# Patient Record
Sex: FEMALE | Race: WHITE | NOT HISPANIC OR LATINO | Employment: FULL TIME | URBAN - METROPOLITAN AREA
[De-identification: names, ages, dates, MRNs, and addresses within clinical notes are randomized per-mention and may not be internally consistent; named-entity substitution may affect disease eponyms.]

---

## 2017-05-16 ENCOUNTER — ALLSCRIPTS OFFICE VISIT (OUTPATIENT)
Dept: OTHER | Facility: OTHER | Age: 22
End: 2017-05-16

## 2018-01-13 VITALS
SYSTOLIC BLOOD PRESSURE: 118 MMHG | BODY MASS INDEX: 21.33 KG/M2 | WEIGHT: 128 LBS | HEIGHT: 65 IN | DIASTOLIC BLOOD PRESSURE: 70 MMHG | HEART RATE: 76 BPM

## 2018-01-14 NOTE — RESULT NOTES
Verified Results  (1) THIN PREP PAP WITH IMAGING [id number] 12:00AM Shai Vang [address]     Test Name Result Flag Reference   LAB AP CASE REPORT (Report)     Gynecologic Cytology Report            Case: [id number]                  Authorizing Provider: Erica Gunetr MD    Collected:      08/12/2016           First Screen:     QUIRINO Singh    Received:      08/17/2016 1019        Specimen:  LIQUID-BASED PAP, SCREENING, Endocervical   LAB AP GYN PRIMARY INTERPRETATION      Negative for intraepithelial lesion or malignancy  Electronically signed by QUIRINO Singh on 8/18/2016 at 3:45 PM   LAB AP GYN SPECIMEN ADEQUACY      Satisfactory for evaluation  Endocervical/transformation zone component present  LAB AP GYN ADDITIONAL INFORMATION (Report)     [facility]'s FDA approved ,  and ThinPrep Imaging System are   utilized with strict adherence to the 's instruction manual to   prepare gynecologic and non-gynecologic cytology specimens for the   production of ThinPrep slides as well as for gynecologic ThinPrep imaging  These processes have been validated by our laboratory and/or by the     The Pap test is not a diagnostic procedure and should not be used as the   sole means to detect cervical cancer  It is only a screening procedure to   aid in the detection of cervical cancer and its precursors  Both   false-negative and false-positive results have been experienced  Your   patient's test result should be interpreted in this context together with   the history and clinical findings

## 2019-05-22 ENCOUNTER — OFFICE VISIT (OUTPATIENT)
Dept: URGENT CARE | Age: 24
End: 2019-05-22
Payer: COMMERCIAL

## 2019-05-22 VITALS
DIASTOLIC BLOOD PRESSURE: 67 MMHG | OXYGEN SATURATION: 98 % | HEART RATE: 78 BPM | TEMPERATURE: 98 F | BODY MASS INDEX: 24.16 KG/M2 | WEIGHT: 145 LBS | HEIGHT: 65 IN | SYSTOLIC BLOOD PRESSURE: 131 MMHG | RESPIRATION RATE: 18 BRPM

## 2019-05-22 DIAGNOSIS — J01.90 ACUTE SINUSITIS, RECURRENCE NOT SPECIFIED, UNSPECIFIED LOCATION: Primary | ICD-10-CM

## 2019-05-22 PROCEDURE — G0382 LEV 3 HOSP TYPE B ED VISIT: HCPCS | Performed by: FAMILY MEDICINE

## 2019-05-22 RX ORDER — FLUTICASONE PROPIONATE 50 MCG
2 SPRAY, SUSPENSION (ML) NASAL DAILY
COMMUNITY

## 2019-05-22 RX ORDER — MONTELUKAST SODIUM 10 MG/1
1 TABLET ORAL DAILY
COMMUNITY
Start: 2017-06-22 | End: 2020-12-23 | Stop reason: SDUPTHER

## 2019-05-22 RX ORDER — FEXOFENADINE HCL 180 MG/1
TABLET ORAL
COMMUNITY
End: 2020-05-20 | Stop reason: ALTCHOICE

## 2019-05-22 RX ORDER — AMOXICILLIN AND CLAVULANATE POTASSIUM 875; 125 MG/1; MG/1
1 TABLET, FILM COATED ORAL EVERY 12 HOURS SCHEDULED
Qty: 20 TABLET | Refills: 0 | Status: SHIPPED | OUTPATIENT
Start: 2019-05-22 | End: 2019-06-01

## 2019-08-13 ENCOUNTER — TELEPHONE (OUTPATIENT)
Dept: OBGYN CLINIC | Facility: CLINIC | Age: 24
End: 2019-08-13

## 2019-08-13 NOTE — TELEPHONE ENCOUNTER
Patient states Mirena is  and had an appointment to have removed today, but does not have insurance to cover removal  She would like to know if she can just leave in for one month than change on 19 when she has insurance  Advised she needs to use a back up method if Mirena is   Routing to provider for advice

## 2019-09-16 ENCOUNTER — ANNUAL EXAM (OUTPATIENT)
Dept: OBGYN CLINIC | Facility: CLINIC | Age: 24
End: 2019-09-16
Payer: COMMERCIAL

## 2019-09-16 VITALS
WEIGHT: 154 LBS | SYSTOLIC BLOOD PRESSURE: 110 MMHG | HEIGHT: 65 IN | BODY MASS INDEX: 25.66 KG/M2 | DIASTOLIC BLOOD PRESSURE: 62 MMHG

## 2019-09-16 DIAGNOSIS — Z01.419 WELL FEMALE EXAM WITH ROUTINE GYNECOLOGICAL EXAM: Primary | ICD-10-CM

## 2019-09-16 DIAGNOSIS — Z12.4 ENCOUNTER FOR SCREENING FOR MALIGNANT NEOPLASM OF CERVIX: ICD-10-CM

## 2019-09-16 PROBLEM — Z97.5 BREAKTHROUGH BLEEDING WITH IUD: Status: ACTIVE | Noted: 2017-05-16

## 2019-09-16 PROBLEM — N92.1 BREAKTHROUGH BLEEDING WITH IUD: Status: ACTIVE | Noted: 2017-05-16

## 2019-09-16 PROCEDURE — 99395 PREV VISIT EST AGE 18-39: CPT | Performed by: OBSTETRICS & GYNECOLOGY

## 2019-09-16 PROCEDURE — G0145 SCR C/V CYTO,THINLAYER,RESCR: HCPCS | Performed by: OBSTETRICS & GYNECOLOGY

## 2019-09-16 RX ORDER — ESCITALOPRAM OXALATE 5 MG/1
5 TABLET ORAL DAILY
COMMUNITY
End: 2020-03-05 | Stop reason: ALTCHOICE

## 2019-09-16 NOTE — PROGRESS NOTES
ASSESSMENT & PLAN: iMryam Carter is a 25 y o  Mayroni Herrerae with normal gynecologic exam     1   Routine well woman exam done today  2   Pap  was done today  Current ASCCP Guidelines reviewed  Last Pap  2016 :  no abnormalities  3   The patient declined STD testing  Safe sex practices have been discussed  4   Gardasil is recommended for patients from 526 years of age  The patient has not had the Gardasil vaccine series  Patient is not interested in vaccination, information given  5  The patient is sexually active  She has a Mirena (8/2014)  for contraception and options have been discussed  She is unsure if she would like IUD revomval vs replacement  Reviewed Mirena use for 7 yrs in [facility], but FDA approval for 5yrs  Will use back up and return in 1 month for removal, vs replacement with likely Kyleena  6  The following were reviewed in today's visit: breast self exam, STD testing, family planning choices, exercise and healthy diet  7  Patient to return to office in 12 months for annual and 1 month for contraceptive visit  All questions have been answered to her satisfaction  CC:  Annual Gynecologic Examination    HPI: Miryam Carter is a 25 y o  Mayroni Johnson who presents for annual gynecologic examination  She has the following concerns:  IUD    Health Maintenance:    She exercises 3 days per week  She wears her seatbelt routinely  She does perform irregular monthly self breast exams  She feels safe at home  Patients does follow a regular diet  History reviewed  No pertinent past medical history  Past Surgical History:   Procedure Laterality Date    WISDOM TOOTH EXTRACTION         Past OB/Gyn History:  Period Cycle (Days): 30  Period Duration (Days): 5 days   Period Pattern: Regular  Menstrual Flow: Light(spotting)  Menstrual Control: Panty liner, Tampon  Dysmenorrhea: (!) Mild  Dysmenorrhea Symptoms: CrampingPatient's last menstrual period was 09/01/2019 (exact date)    Menstrual History:  OB History        0    Para   0    Term   0       0    AB   0    Living   0       SAB   0    TAB   0    Ectopic   0    Multiple   0    Live Births   0           Obstetric Comments   Menarche 15            History of sexually transmitted infection No  Patient is currently sexually active  heterosexual Birth control: Mirena IUD since       Family History   Problem Relation Age of Onset    No Known Problems Mother     No Known Problems Father     No Known Problems Sister     No Known Problems Brother     No Known Problems Maternal Grandmother     No Known Problems Maternal Grandfather     No Known Problems Paternal Grandmother     No Known Problems Paternal Grandfather     Breast cancer Paternal Aunt        Social History:  Social History     Socioeconomic History    Marital status: /Civil Union     Spouse name: Not on file    Number of children: Not on file    Years of education: Not on file    Highest education level: Not on file   Occupational History    Not on file   Social Needs    Financial resource strain: Not on file    Food insecurity:     Worry: Not on file     Inability: Not on file    Transportation needs:     Medical: Not on file     Non-medical: Not on file   Tobacco Use    Smoking status: Never Smoker    Smokeless tobacco: Never Used   Substance and Sexual Activity    Alcohol use: Yes     Comment: rare    Drug use: Never    Sexual activity: Yes     Partners: Male     Birth control/protection: IUD   Lifestyle    Physical activity:     Days per week: Not on file     Minutes per session: Not on file    Stress: Not on file   Relationships    Social connections:     Talks on phone: Not on file     Gets together: Not on file     Attends Hoahaoism service: Not on file     Active member of club or organization: Not on file     Attends meetings of clubs or organizations: Not on file     Relationship status: Not on file    Intimate partner violence: Fear of current or ex partner: Not on file     Emotionally abused: Not on file     Physically abused: Not on file     Forced sexual activity: Not on file   Other Topics Concern    Not on file   Social History Narrative    Not on file     Presently lives with her   Patient is   Patient is currently employed   Allergies   Allergen Reactions    Seasonal Ic  [Cholestatin]        Current Outpatient Medications:     escitalopram (LEXAPRO) 5 mg tablet, Take 5 mg by mouth daily, Disp: , Rfl:     fexofenadine (ALLEGRA ALLERGY) 180 MG tablet, Take by mouth, Disp: , Rfl:     fluticasone (FLONASE) 50 mcg/act nasal spray, 2 sprays into each nostril daily, Disp: , Rfl:     montelukast (SINGULAIR) 10 mg tablet, Take 1 tablet by mouth daily, Disp: , Rfl:     Review of Systems:  Review of Systems   Constitutional: Negative  HENT: Negative  Respiratory: Negative  Cardiovascular: Negative  Gastrointestinal: Negative  Genitourinary: Negative  Neurological: Negative  Psychiatric/Behavioral: Negative  Physical Exam:  /62   Ht 5' 4 5" (1 638 m)   Wt 69 9 kg (154 lb)   LMP 09/01/2019 (Exact Date)   Breastfeeding? No   BMI 26 03 kg/m²    Physical Exam   Constitutional: She is oriented to person, place, and time  She appears well-developed and well-nourished  No distress  Genitourinary: Vagina normal and uterus normal  There is no rash, tenderness, lesion, injury or Bartholin's cyst on the right labia  There is no rash, tenderness, lesion, injury or Bartholin's cyst on the left labia  Vagina exhibits rugosity  No tenderness or bleeding in the vagina  No vaginal discharge found  Right adnexum does not display mass, does not display tenderness and does not display fullness  Left adnexum does not display mass, does not display tenderness and does not display fullness  Cervix is nulliparous  Cervix exhibits pinkness and visible IUD strings   Cervix does not exhibit motion tenderness, discharge or polyp  Uterus is mobile  Uterus is not enlarged or tender  Genitourinary Comments: Non tender bladder   HENT:   Head: Normocephalic and atraumatic  Cardiovascular: Normal rate, regular rhythm and normal heart sounds  No murmur heard  Pulmonary/Chest: Effort normal and breath sounds normal  No respiratory distress  She has no wheezes  Right breast exhibits no inverted nipple, no mass, no nipple discharge, no skin change and no tenderness  Left breast exhibits no inverted nipple, no mass, no nipple discharge, no skin change and no tenderness  Abdominal: Soft  She exhibits no distension  There is no tenderness  There is no guarding  Neurological: She is alert and oriented to person, place, and time  Skin: Skin is warm and dry  She is not diaphoretic  No erythema  No pallor  Nursing note and vitals reviewed

## 2019-09-20 LAB
LAB AP GYN PRIMARY INTERPRETATION: NORMAL
Lab: NORMAL

## 2020-01-21 ENCOUNTER — OFFICE VISIT (OUTPATIENT)
Dept: URGENT CARE | Age: 25
End: 2020-01-21
Payer: COMMERCIAL

## 2020-01-21 VITALS
DIASTOLIC BLOOD PRESSURE: 60 MMHG | HEART RATE: 102 BPM | TEMPERATURE: 98 F | RESPIRATION RATE: 18 BRPM | SYSTOLIC BLOOD PRESSURE: 126 MMHG | OXYGEN SATURATION: 99 %

## 2020-01-21 DIAGNOSIS — R68.84 JAW PAIN: ICD-10-CM

## 2020-01-21 DIAGNOSIS — H92.01 RIGHT EAR PAIN: Primary | ICD-10-CM

## 2020-01-21 PROCEDURE — 99213 OFFICE O/P EST LOW 20 MIN: CPT | Performed by: FAMILY MEDICINE

## 2020-01-21 RX ORDER — AMOXICILLIN 500 MG/1
500 CAPSULE ORAL EVERY 8 HOURS SCHEDULED
Qty: 30 CAPSULE | Refills: 0 | Status: SHIPPED | OUTPATIENT
Start: 2020-01-21 | End: 2020-01-31

## 2020-01-21 NOTE — PROGRESS NOTES
[facility]        NAME: Patricia Parsons is a 25 y o  female  : 1995    MRN: [id number]  DATE: 2020  TIME: 12:08 PM    Assessment and Plan   Right ear pain [H92 01]  1  Right ear pain  amoxicillin (AMOXIL) 500 mg capsule   2  Jaw pain  amoxicillin (AMOXIL) 500 mg capsule         Patient Instructions     Patient Instructions   Amoxicillin 3 times a day until finished (please take probiotics)  Tylenol, or ibuprofen (Advil/Motrin) as needed  Try Flonase nasal spray 1 spray in each nostril once or twice a day)  Recheck/follow-up with family physician/dentist as discussed  Fernsanto Reissathish   [phone]    Please go to the hospital emergency department if needed  Follow up with PCP/Dentist in 3-5 days  Proceed to  ER if symptoms worsen  Chief Complaint     Chief Complaint   Patient presents with    Earache     right, and down into jaw , x intermittent x 2 weeks         History of Present Illness       Right-sided ear pain that radiates into the right jaw area; slight sinus congestion      Review of Systems   Review of Systems   HENT: Positive for ear pain and sinus pressure  Respiratory: Negative  Cardiovascular: Negative  Musculoskeletal: Negative  Skin: Negative  Neurological: Negative            Current Medications       Current Outpatient Medications:     amoxicillin (AMOXIL) 500 mg capsule, Take 1 capsule (500 mg total) by mouth every 8 (eight) hours for 30 doses, Disp: 30 capsule, Rfl: 0    escitalopram (LEXAPRO) 5 mg tablet, Take 5 mg by mouth daily, Disp: , Rfl:     fexofenadine (ALLEGRA ALLERGY) 180 MG tablet, Take by mouth, Disp: , Rfl:     fluticasone (FLONASE) 50 mcg/act nasal spray, 2 sprays into each nostril daily, Disp: , Rfl:     montelukast (SINGULAIR) 10 mg tablet, Take 1 tablet by mouth daily, Disp: , Rfl:     Current Allergies     Allergies as of 2020 - Reviewed 2020   Allergen Reaction Noted    Seasonal ic  [cholestatin] 11/23/2015            The following portions of the patient's history were reviewed and updated as appropriate: allergies, current medications, past family history, past medical history, past social history, past surgical history and problem list      History reviewed  No pertinent past medical history  Past Surgical History:   Procedure Laterality Date    WISDOM TOOTH EXTRACTION         Family History   Problem Relation Age of Onset    No Known Problems Mother     No Known Problems Father     No Known Problems Sister     No Known Problems Brother     No Known Problems Maternal Grandmother     No Known Problems Maternal Grandfather     No Known Problems Paternal Grandmother     No Known Problems Paternal Grandfather     Breast cancer Paternal Aunt          Medications have been verified  Objective   /60   Pulse 102   Temp 98 °F (36 7 °C)   Resp 18   LMP 01/21/2020   SpO2 99%        Physical Exam     Physical Exam   Constitutional: She is oriented to person, place, and time  She appears well-developed and well-nourished  HENT:   Retraction of the right eardrum; slight nasal congestion; discomfort over the sinuses   Neck: Normal range of motion  Neck supple  Cardiovascular: Normal rate, regular rhythm and normal heart sounds  Pulmonary/Chest: Effort normal and breath sounds normal    Neurological: She is alert and oriented to person, place, and time  No nuchal rigidity   Skin:   Good color and turgor   Psychiatric: She has a normal mood and affect  Her behavior is normal    Nursing note and vitals reviewed

## 2020-01-21 NOTE — PATIENT INSTRUCTIONS
Amoxicillin 3 times a day until finished (please take probiotics)  Tylenol, or ibuprofen (Advil/Motrin) as needed  Try Flonase nasal spray 1 spray in each nostril once or twice a day)  Recheck/follow-up with family physician/dentist as discussed  Corey Rowell   [phone]    Please go to the hospital emergency department if needed

## 2020-02-10 ENCOUNTER — OFFICE VISIT (OUTPATIENT)
Dept: GASTROENTEROLOGY | Facility: CLINIC | Age: 25
End: 2020-02-10
Payer: COMMERCIAL

## 2020-02-10 ENCOUNTER — PREP FOR PROCEDURE (OUTPATIENT)
Dept: GASTROENTEROLOGY | Facility: CLINIC | Age: 25
End: 2020-02-10

## 2020-02-10 VITALS
SYSTOLIC BLOOD PRESSURE: 127 MMHG | TEMPERATURE: 98 F | BODY MASS INDEX: 25.83 KG/M2 | DIASTOLIC BLOOD PRESSURE: 74 MMHG | WEIGHT: 155 LBS | HEIGHT: 65 IN | HEART RATE: 82 BPM

## 2020-02-10 DIAGNOSIS — D80.2 IGA DEFICIENCY, SELECTIVE (HCC): ICD-10-CM

## 2020-02-10 DIAGNOSIS — K21.9 GASTROESOPHAGEAL REFLUX DISEASE, ESOPHAGITIS PRESENCE NOT SPECIFIED: Primary | ICD-10-CM

## 2020-02-10 DIAGNOSIS — R14.0 BLOATING: ICD-10-CM

## 2020-02-10 DIAGNOSIS — K20.90 ESOPHAGITIS: Primary | ICD-10-CM

## 2020-02-10 PROCEDURE — 99204 OFFICE O/P NEW MOD 45 MIN: CPT | Performed by: INTERNAL MEDICINE

## 2020-02-10 NOTE — PATIENT INSTRUCTIONS
Continue to avoid greasy, spicy foods, tomato sauces, citrus juices, peppermint, chocolate, carbonated beverages and limit coffee  Eat small meals and avoid eating 2-3 hours prior to going to bed  Add Tums as needed  EGD scheduled 3/5/20 with Dr Cinthia Mckeon at [facility]  Instructions given to pt during checkout

## 2020-02-10 NOTE — H&P (VIEW-ONLY)
[address] [facility] - Outpatient Consultation  Pranay Bush 25 y o  female MRN: [id number]  Encounter: [id number]          ASSESSMENT AND PLAN:  Ms Gloria Priest was seen today for GERD with esophagitis  Diagnoses and all orders for this visit:    1  Gastroesophageal reflux disease with esophagitis: She reports recent worsening of epigastric burning pain, reflux and regurgitation  Her symptoms have worsened to the point that she has been taking omeprazole 20 mg QD for 3 months  She will continue on this medication for now and add Tums as needed  Given her young age and history of esophagitis, I will perform an EGD to assess for esophagitis, Bustillo's esophagus or hiatal hernia  We discussed treatment options including stretta and Linx  We also discussed dietary and lifestyle measures for management of GERD, and if EGD does not show significant inflammation, we will focus on these  We briefly discussed none medical management of gastroesophageal reflux disease  For this discussion after EGD is completed  2  Bloating: She reports frequent epigastric pressure and discomfort  I recommended she avoid coffee and carbonated beverages  I will assess for causes of her bloating during EGD, as above  She had duodenal biopsy in the past which was negative for celiac disease  3  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[facility] This is managed by her primary team    ______________________________________________________________________    HPI: Pranay Bush is a 25 y o  female with selective IgA deficiency, palpitations and anxiety in the office today for evaluation and management of epigastric burning pain and bloating  EGD in 2014 with Dr Lona Burgos indicated esophagitis at the GE junction and gastritis  Duodenal biopsy was negative for celiac disease  She was prescribed omeprazole but was advised to not take this medication long term       Today, she reports that in spite of a careful diet, her epigastric burning pain, pressure, reflux and regurgitation continue  Her symptoms have worsened to the point that she has been taking omeprazole QD for 3 months  She feels like she would have relief if she could burp but is unable to do so  She denies dysphagia, nausea, vomiting, constipation or diarrhea  She has recently discontinued Lexapro, without any noticeable change in her GERD symptoms  Her father has indigestion  REVIEW OF SYSTEMS:    CONSTITUTIONAL: Denies any fever, chills, rigors, and weight loss  HEENT: No earache or tinnitus  Denies hearing loss or visual disturbances  CARDIOVASCULAR: No chest pain or palpitations  RESPIRATORY: Denies any cough, hemoptysis, shortness of breath or dyspnea on exertion  GASTROINTESTINAL: As noted in the History of Present Illness  GENITOURINARY: No problems with urination  Denies any hematuria or dysuria  NEUROLOGIC: No dizziness or vertigo, denies headaches  MUSCULOSKELETAL: Denies any muscle or joint pain  SKIN: Denies skin rashes or itching  ENDOCRINE: Denies excessive thirst  Denies intolerance to heat or cold  PSYCHOSOCIAL: Denies depression or anxiety  Denies any recent memory loss  Historical Information   History reviewed  No pertinent past medical history    Past Surgical History:   Procedure Laterality Date    WISDOM TOOTH EXTRACTION       Social History   Social History     Substance and Sexual Activity   Alcohol Use Yes    Comment: rare     Social History     Substance and Sexual Activity   Drug Use Never     Social History     Tobacco Use   Smoking Status Never Smoker   Smokeless Tobacco Never Used     Family History   Problem Relation Age of Onset    No Known Problems Mother     No Known Problems Father     No Known Problems Sister     No Known Problems Brother     No Known Problems Maternal Grandmother     No Known Problems Maternal Grandfather     No Known Problems Paternal Grandmother     No Known Problems Paternal Grandfather    [employer] Breast cancer Paternal Aunt        Meds/Allergies       Current Outpatient Medications:     fexofenadine (ALLEGRA ALLERGY) 180 MG tablet    fluticasone (FLONASE) 50 mcg/act nasal spray    levonorgestrel (MIRENA) 20 MCG/24HR IUD    escitalopram (LEXAPRO) 5 mg tablet    montelukast (SINGULAIR) 10 mg tablet    Allergies   Allergen Reactions    Seasonal Ic  [Cholestatin]            Objective     Blood pressure 127/74, pulse 82, temperature 98 °F (36 7 °C), temperature source Tympanic, height 5' 5" (1 651 m), weight 70 3 kg (155 lb), last menstrual period 01/21/2020, not currently breastfeeding  Body mass index is 25 79 kg/m²  PHYSICAL EXAM:      General Appearance:   Alert, cooperative, no distress   HEENT:   Normocephalic, atraumatic, anicteric      Neck:  Supple, symmetrical, trachea midline   Lungs:   Clear to auscultation bilaterally; no rales, rhonchi or wheezing; respirations unlabored    Heart[de-identified]   Regular rate and rhythm; no murmur, rub, or gallop  Abdomen:   Soft, non-tender, non-distended; normal bowel sounds; no masses, no organomegaly    Genitalia:   Deferred    Rectal:   Deferred    Extremities:  No cyanosis, clubbing or edema    Pulses:  2+ and symmetric    Skin:  No jaundice, rashes, or lesions    Lymph nodes:  No palpable cervical lymphadenopathy        Lab Results:   No visits with results within 1 Day(s) from this visit     Latest known visit with results is:   Annual Exam on 09/16/2019   Component Date Value    Case Report 09/16/2019                      Value:Gynecologic Cytology Report                       Case: [id number]                                  Authorizing Provider:  Scarlet Diaz MD     Collected:           09/16/2019 1059              Ordering Location:     [facility]  Received:            09/16/2019 [facility]                                                        First Screen:          Wai Topete QUIRINO Whipple                                                    Specimen:    LIQUID-BASED PAP, SCREENING, Cervix, Endocervical                                          Primary Interpretation 09/16/2019 Negative for intraepithelial lesion or malignancy     Specimen Adequacy 09/16/2019 Satisfactory for evaluation  Endocervical/transformation zone component present   Additional Information 09/16/2019                      Value: This result contains rich text formatting which cannot be displayed here  Radiology Results:   No results found  Attestation:   By signing my name below, Cate Dean, attest that this d ocumentation has been prepared under the direction and in the presence of Kay Lennox, M D  Electronically Signed: Sharon Molina  2/10/2020         I, Kay Lennox, personally performed the services described in this documentation  All medical record entries made by the bridgettibmaggie were at my direction and in my presence  I have reviewed the chart and discharge instructions and agree that the record reflects my personal performance and is accurate and complete  Kay Lennox, M D  2/10/2020

## 2020-02-10 NOTE — PROGRESS NOTES
[facility] - Outpatient Consultation  William Avalos 25 y o  female MRN: [id number]  Encounter: [id number]          ASSESSMENT AND PLAN:  Ms Valentine Jiménez was seen today for GERD with esophagitis  Diagnoses and all orders for this visit:    1  Gastroesophageal reflux disease with esophagitis: She reports recent worsening of epigastric burning pain, reflux and regurgitation  Her symptoms have worsened to the point that she has been taking omeprazole 20 mg QD for 3 months  She will continue on this medication for now and add Tums as needed  Given her young age and history of esophagitis, I will perform an EGD to assess for esophagitis, Bustillo's esophagus or hiatal hernia  We discussed treatment options including stretta and Linx  We also discussed dietary and lifestyle measures for management of GERD, and if EGD does not show significant inflammation, we will focus on these  We briefly discussed none medical management of gastroesophageal reflux disease  For this discussion after EGD is completed  2  Bloating: She reports frequent epigastric pressure and discomfort  I recommended she avoid coffee and carbonated beverages  I will assess for causes of her bloating during EGD, as above  She had duodenal biopsy in the past which was negative for celiac disease  3  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[facility] ): This is managed by her primary team    ______________________________________________________________________    HPI: William Avalos is a 25 y o  female with selective IgA deficiency, palpitations and anxiety in the office today for evaluation and management of epigastric burning pain and bloating  EGD in 2014 with Dr Lady Nazario indicated esophagitis at the GE junction and gastritis  Duodenal biopsy was negative for celiac disease  She was prescribed omeprazole but was advised to not take this medication long term       Today, she reports that in spite of a careful diet, her epigastric burning pain, pressure, reflux and regurgitation continue  Her symptoms have worsened to the point that she has been taking omeprazole QD for 3 months  She feels like she would have relief if she could burp but is unable to do so  She denies dysphagia, nausea, vomiting, constipation or diarrhea  She has recently discontinued Lexapro, without any noticeable change in her GERD symptoms  Her father has indigestion  REVIEW OF SYSTEMS:    CONSTITUTIONAL: Denies any fever, chills, rigors, and weight loss  HEENT: No earache or tinnitus  Denies hearing loss or visual disturbances  CARDIOVASCULAR: No chest pain or palpitations  RESPIRATORY: Denies any cough, hemoptysis, shortness of breath or dyspnea on exertion  GASTROINTESTINAL: As noted in the History of Present Illness  GENITOURINARY: No problems with urination  Denies any hematuria or dysuria  NEUROLOGIC: No dizziness or vertigo, denies headaches  MUSCULOSKELETAL: Denies any muscle or joint pain  SKIN: Denies skin rashes or itching  ENDOCRINE: Denies excessive thirst  Denies intolerance to heat or cold  PSYCHOSOCIAL: Denies depression or anxiety  Denies any recent memory loss  Historical Information   History reviewed  No pertinent past medical history    Past Surgical History:   Procedure Laterality Date    WISDOM TOOTH EXTRACTION       Social History   Social History     Substance and Sexual Activity   Alcohol Use Yes    Comment: rare     Social History     Substance and Sexual Activity   Drug Use Never     Social History     Tobacco Use   Smoking Status Never Smoker   Smokeless Tobacco Never Used     Family History   Problem Relation Age of Onset    No Known Problems Mother     No Known Problems Father     No Known Problems Sister     No Known Problems Brother     No Known Problems Maternal Grandmother     No Known Problems Maternal Grandfather     No Known Problems Paternal Grandmother     No Known Problems Paternal Grandfather    Riley Sharma Breast cancer Paternal Aunt        Meds/Allergies       Current Outpatient Medications:     fexofenadine (ALLEGRA ALLERGY) 180 MG tablet    fluticasone (FLONASE) 50 mcg/act nasal spray    levonorgestrel (MIRENA) 20 MCG/24HR IUD    escitalopram (LEXAPRO) 5 mg tablet    montelukast (SINGULAIR) 10 mg tablet    Allergies   Allergen Reactions    Seasonal Ic  [Cholestatin]            Objective     Blood pressure 127/74, pulse 82, temperature 98 °F (36 7 °C), temperature source Tympanic, height 5' 5" (1 651 m), weight 70 3 kg (155 lb), last menstrual period 01/21/2020, not currently breastfeeding  Body mass index is 25 79 kg/m²  PHYSICAL EXAM:      General Appearance:   Alert, cooperative, no distress   HEENT:   Normocephalic, atraumatic, anicteric      Neck:  Supple, symmetrical, trachea midline   Lungs:   Clear to auscultation bilaterally; no rales, rhonchi or wheezing; respirations unlabored    Heart[de-identified]   Regular rate and rhythm; no murmur, rub, or gallop  Abdomen:   Soft, non-tender, non-distended; normal bowel sounds; no masses, no organomegaly    Genitalia:   Deferred    Rectal:   Deferred    Extremities:  No cyanosis, clubbing or edema    Pulses:  2+ and symmetric    Skin:  No jaundice, rashes, or lesions    Lymph nodes:  No palpable cervical lymphadenopathy        Lab Results:   No visits with results within 1 Day(s) from this visit     Latest known visit with results is:   Annual Exam on 09/16/2019   Component Date Value    Case Report 09/16/2019                      Value:Gynecologic Cytology Report                       Case: [id number]                                  Authorizing Provider:  Juan Carlos Banuelos MD     Collected:           09/16/2019 1059              Ordering Location:     [facility]  Received:            09/16/2019 [facility]                                                        First Screen:          Shelbi Davis QUIRINO Whipple                                                    Specimen:    LIQUID-BASED PAP, SCREENING, Cervix, Endocervical                                          Primary Interpretation 09/16/2019 Negative for intraepithelial lesion or malignancy     Specimen Adequacy 09/16/2019 Satisfactory for evaluation  Endocervical/transformation zone component present   Additional Information 09/16/2019                      Value: This result contains rich text formatting which cannot be displayed here  Radiology Results:   No results found  Attestation:   By signing my name below, Perez Alicea, [email or web address] that this d ocumentation has been prepared under the direction and in the presence of MARCIA Light  Electronically Signed: Cristobal Chaidez  2/10/2020         I, Eloisa Hidalgo, personally performed the services described in this documentation  All medical record entries made by the cristobal were at my direction and in my presence  I have reviewed the chart and discharge instructions and agree that the record reflects my personal performance and is accurate and complete  MARCIA Light  2/10/2020

## 2020-02-11 ENCOUNTER — TRANSCRIBE ORDERS (OUTPATIENT)
Dept: GASTROENTEROLOGY | Facility: CLINIC | Age: 25
End: 2020-02-11

## 2020-02-21 ENCOUNTER — TELEPHONE (OUTPATIENT)
Dept: GASTROENTEROLOGY | Facility: CLINIC | Age: 25
End: 2020-02-21

## 2020-02-21 ENCOUNTER — TRANSCRIBE ORDERS (OUTPATIENT)
Dept: GASTROENTEROLOGY | Facility: CLINIC | Age: 25
End: 2020-02-21

## 2020-02-21 NOTE — TELEPHONE ENCOUNTER
Called [facility] to initiate a prior authorization for patient's EGD  Spoke to [facility]  Requested I fax clinicals to [phone]    Pending Auth [id number]  Call ref #[id number]

## 2020-02-21 NOTE — TELEPHONE ENCOUNTER
Received a call back from [place] at [facility]  Patient's EGD has been approved  Hannah Johns #[id number], eff 3/5 - 6/3/20

## 2020-03-04 ENCOUNTER — ANESTHESIA EVENT (OUTPATIENT)
Dept: GASTROENTEROLOGY | Facility: AMBULARY SURGERY CENTER | Age: 25
End: 2020-03-04

## 2020-03-05 ENCOUNTER — ANESTHESIA (OUTPATIENT)
Dept: GASTROENTEROLOGY | Facility: AMBULARY SURGERY CENTER | Age: 25
End: 2020-03-05

## 2020-03-05 ENCOUNTER — HOSPITAL ENCOUNTER (OUTPATIENT)
Dept: GASTROENTEROLOGY | Facility: AMBULARY SURGERY CENTER | Age: 25
Setting detail: OUTPATIENT SURGERY
Discharge: HOME | End: 2020-03-05
Attending: INTERNAL MEDICINE | Admitting: INTERNAL MEDICINE
Payer: COMMERCIAL

## 2020-03-05 VITALS
RESPIRATION RATE: 18 BRPM | TEMPERATURE: 98.4 F | HEART RATE: 92 BPM | BODY MASS INDEX: 25.83 KG/M2 | WEIGHT: 155 LBS | HEIGHT: 65 IN | OXYGEN SATURATION: 99 % | SYSTOLIC BLOOD PRESSURE: 110 MMHG | DIASTOLIC BLOOD PRESSURE: 60 MMHG

## 2020-03-05 DIAGNOSIS — K21.00 GASTROESOPHAGEAL REFLUX DISEASE WITH ESOPHAGITIS: Primary | ICD-10-CM

## 2020-03-05 DIAGNOSIS — K21.9 GASTROESOPHAGEAL REFLUX DISEASE, ESOPHAGITIS PRESENCE NOT SPECIFIED: ICD-10-CM

## 2020-03-05 LAB
EXT PREGNANCY TEST URINE: NEGATIVE
EXT. CONTROL: NORMAL

## 2020-03-05 PROCEDURE — 43239 EGD BIOPSY SINGLE/MULTIPLE: CPT | Performed by: INTERNAL MEDICINE

## 2020-03-05 PROCEDURE — 88305 TISSUE EXAM BY PATHOLOGIST: CPT | Performed by: PATHOLOGY

## 2020-03-05 PROCEDURE — 81025 URINE PREGNANCY TEST: CPT | Performed by: ANESTHESIOLOGY

## 2020-03-05 RX ORDER — OMEPRAZOLE 20 MG/1
20 CAPSULE, DELAYED RELEASE ORAL DAILY
COMMUNITY
End: 2020-03-05 | Stop reason: SDUPTHER

## 2020-03-05 RX ORDER — LIDOCAINE HYDROCHLORIDE 20 MG/ML
INJECTION, SOLUTION EPIDURAL; INFILTRATION; INTRACAUDAL; PERINEURAL AS NEEDED
Status: DISCONTINUED | OUTPATIENT
Start: 2020-03-05 | End: 2020-03-05 | Stop reason: SURG

## 2020-03-05 RX ORDER — OMEPRAZOLE 40 MG/1
40 CAPSULE, DELAYED RELEASE ORAL DAILY
Qty: 30 CAPSULE | Refills: 1 | Status: SHIPPED | OUTPATIENT
Start: 2020-03-05 | End: 2020-05-07 | Stop reason: SDUPTHER

## 2020-03-05 RX ORDER — CALCIUM CARBONATE 200(500)MG
1 TABLET,CHEWABLE ORAL AS NEEDED
COMMUNITY

## 2020-03-05 RX ORDER — SODIUM CHLORIDE, SODIUM LACTATE, POTASSIUM CHLORIDE, CALCIUM CHLORIDE 600; 310; 30; 20 MG/100ML; MG/100ML; MG/100ML; MG/100ML
125 INJECTION, SOLUTION INTRAVENOUS CONTINUOUS
Status: DISCONTINUED | OUTPATIENT
Start: 2020-03-05 | End: 2020-03-09 | Stop reason: HOSPADM

## 2020-03-05 RX ORDER — PROPOFOL 10 MG/ML
INJECTION, EMULSION INTRAVENOUS AS NEEDED
Status: DISCONTINUED | OUTPATIENT
Start: 2020-03-05 | End: 2020-03-05 | Stop reason: SURG

## 2020-03-05 RX ADMIN — PROPOFOL 20 MG: 10 INJECTION, EMULSION INTRAVENOUS at 11:21

## 2020-03-05 RX ADMIN — LIDOCAINE HYDROCHLORIDE 100 MG: 20 INJECTION, SOLUTION EPIDURAL; INFILTRATION; INTRACAUDAL; PERINEURAL at 11:16

## 2020-03-05 RX ADMIN — PROPOFOL 30 MG: 10 INJECTION, EMULSION INTRAVENOUS at 11:19

## 2020-03-05 RX ADMIN — PROPOFOL 30 MG: 10 INJECTION, EMULSION INTRAVENOUS at 11:17

## 2020-03-05 RX ADMIN — PROPOFOL 40 MG: 10 INJECTION, EMULSION INTRAVENOUS at 11:18

## 2020-03-05 RX ADMIN — PROPOFOL 40 MG: 10 INJECTION, EMULSION INTRAVENOUS at 11:20

## 2020-03-05 RX ADMIN — PROPOFOL 250 MG: 10 INJECTION, EMULSION INTRAVENOUS at 11:16

## 2020-03-05 RX ADMIN — SODIUM CHLORIDE, SODIUM LACTATE, POTASSIUM CHLORIDE, AND CALCIUM CHLORIDE 125 ML/HR: .6; .31; .03; .02 INJECTION, SOLUTION INTRAVENOUS at 10:58

## 2020-03-05 NOTE — ANESTHESIA POSTPROCEDURE EVALUATION
Post-Op Assessment Note    CV Status:  Stable  Pain Score: 0    Pain management: adequate     Mental Status:  Alert and awake   Hydration Status:  Euvolemic   PONV Controlled:  Controlled   Airway Patency:  Patent and adequate   Post Op Vitals Reviewed: Yes      Staff: CRNA           BP   125/71   Temp      Pulse  91   Resp   13   SpO2   96%

## 2020-03-05 NOTE — ANESTHESIA PREPROCEDURE EVALUATION
Review of Systems/Medical History  Patient summary reviewed  Chart reviewed  No history of anesthetic complications     Cardiovascular  Exercise tolerance (METS): >4,     Pulmonary  Negative pulmonary ROS        GI/Hepatic  Negative GI/hepatic ROS          Negative  ROS        Endo/Other  Negative endo/other ROS      GYN  Not currently pregnant ,          Hematology   Musculoskeletal       Neurology   Psychology   Anxiety,              Physical Exam    Airway    Mallampati score: I  TM Distance: >3 FB  Neck ROM: full     Dental       Cardiovascular      Pulmonary      Other Findings        Anesthesia Plan  ASA Score- 1     Anesthesia Type- IV sedation with anesthesia with ASA Monitors  Additional Monitors:   Airway Plan:         Plan Factors-    Induction- intravenous  Postoperative Plan-     Informed Consent- Anesthetic plan and risks discussed with patient  I personally reviewed this patient with the CRNA  Discussed and agreed on the Anesthesia Plan with the CRNA  Gurvinder Stinson

## 2020-03-05 NOTE — DISCHARGE INSTRUCTIONS
Gastroesophageal Reflux Disease   WHAT YOU NEED TO KNOW:   Gastroesophageal reflux occurs when acid and food in the stomach back up into the esophagus  Gastroesophageal reflux disease (GERD) is reflux that occurs more than twice a week for a few weeks  It usually causes heartburn and other symptoms  GERD can cause other health problems over time if it is not treated  DISCHARGE INSTRUCTIONS:   Seek care immediately if:   · You feel full and cannot burp or vomit  · You have severe chest pain and sudden trouble breathing  · Your bowel movements are black, bloody, or tarry-looking  · Your vomit looks like coffee grounds or has blood in it  Contact your healthcare provider if:   · You vomit large amounts, or you vomit often  · You have trouble breathing after you vomit  · You have trouble swallowing, or pain with swallowing  · You are losing weight without trying  · Your symptoms get worse or do not improve with treatment  · You have questions or concerns about your condition or care  Medicines:   · Medicines  are used to decrease stomach acid  Medicine may also be used to help your lower esophageal sphincter and stomach contract (tighten) more  · Take your medicine as directed  Contact your healthcare provider if you think your medicine is not helping or if you have side effects  Tell him or her if you are allergic to any medicine  Keep a list of the medicines, vitamins, and herbs you take  Include the amounts, and when and why you take them  Bring the list or the pill bottles to follow-up visits  Carry your medicine list with you in case of an emergency  Manage GERD:   · Do not have foods or drinks that may increase heartburn  These include chocolate, peppermint, fried or fatty foods, drinks that contain caffeine, or carbonated drinks (soda)  Other foods include spicy foods, onions, tomatoes, and tomato-based foods   Do not have foods or drinks that can irritate your esophagus, such as citrus fruits, juices, and alcohol  · Do not eat large meals  When you eat a lot of food at one time, your stomach needs more acid to digest it  Eat 6 small meals each day instead of 3 large ones, and eat slowly  Do not eat meals 2 to 3 hours before bedtime  · Elevate the head of your bed  Place 6-inch blocks under the head of your bed frame  You may also use more than one pillow under your head and shoulders while you sleep  · Maintain a healthy weight  If you are overweight, weight loss may help relieve symptoms of GERD  · Do not smoke  Smoking weakens the lower esophageal sphincter and increases the risk of GERD  Ask your healthcare provider for information if you currently smoke and need help to quit  E-cigarettes or smokeless tobacco still contain nicotine  Talk to your healthcare provider before you use these products  · Do not wear clothing that is tight around your waist   Tight clothing can put pressure on your stomach and cause or worsen GERD symptoms  Follow up with your healthcare provider as directed:  Write down your questions so you remember to ask them during your visits  © 2017 [facility] Information is for End User's use only and may not be sold, redistributed or otherwise used for commercial purposes  All illustrations and images included in CareNotes® are the copyrighted property of A D A M , Inc  or Ryan Aguiar  The above information is an  only  It is not intended as medical advice for individual conditions or treatments  Talk to your doctor, nurse or pharmacist before following any medical regimen to see if it is safe and effective for you

## 2020-03-05 NOTE — INTERVAL H&P NOTE
H&P reviewed  After examining the patient I find no changes in the patients condition since the H&P had been written      Vitals:    03/05/20 1053   BP: 120/59   Pulse: 100   Resp: 18   Temp: 98 8 °F (37 1 °C)   SpO2: 100%

## 2020-05-07 ENCOUNTER — TELEPHONE (OUTPATIENT)
Dept: GASTROENTEROLOGY | Facility: CLINIC | Age: 25
End: 2020-05-07

## 2020-05-07 DIAGNOSIS — K21.00 GASTROESOPHAGEAL REFLUX DISEASE WITH ESOPHAGITIS: ICD-10-CM

## 2020-05-07 RX ORDER — OMEPRAZOLE 40 MG/1
40 CAPSULE, DELAYED RELEASE ORAL DAILY
Qty: 30 CAPSULE | Refills: 5 | Status: SHIPPED | OUTPATIENT
Start: 2020-05-07 | End: 2020-05-20 | Stop reason: ALTCHOICE

## 2020-05-13 ENCOUNTER — TELEMEDICINE (OUTPATIENT)
Dept: GASTROENTEROLOGY | Facility: CLINIC | Age: 25
End: 2020-05-13
Payer: COMMERCIAL

## 2020-05-13 VITALS — WEIGHT: 155 LBS | HEIGHT: 65 IN | BODY MASS INDEX: 25.83 KG/M2

## 2020-05-13 DIAGNOSIS — K21.00 GASTROESOPHAGEAL REFLUX DISEASE WITH ESOPHAGITIS: Primary | ICD-10-CM

## 2020-05-13 DIAGNOSIS — R14.0 BLOATING: ICD-10-CM

## 2020-05-13 PROCEDURE — 1036F TOBACCO NON-USER: CPT | Performed by: INTERNAL MEDICINE

## 2020-05-13 PROCEDURE — 99213 OFFICE O/P EST LOW 20 MIN: CPT | Performed by: INTERNAL MEDICINE

## 2020-05-13 PROCEDURE — 3008F BODY MASS INDEX DOCD: CPT | Performed by: INTERNAL MEDICINE

## 2020-05-13 RX ORDER — FAMOTIDINE 20 MG/1
20 TABLET, FILM COATED ORAL 2 TIMES DAILY
Qty: 30 TABLET | Refills: 3 | Status: SHIPPED | OUTPATIENT
Start: 2020-05-13 | End: 2020-05-20 | Stop reason: SDUPTHER

## 2020-05-20 ENCOUNTER — OFFICE VISIT (OUTPATIENT)
Dept: FAMILY MEDICINE CLINIC | Facility: CLINIC | Age: 25
End: 2020-05-20
Payer: COMMERCIAL

## 2020-05-20 VITALS
SYSTOLIC BLOOD PRESSURE: 122 MMHG | HEART RATE: 74 BPM | BODY MASS INDEX: 25.16 KG/M2 | WEIGHT: 151 LBS | HEIGHT: 65 IN | TEMPERATURE: 98 F | DIASTOLIC BLOOD PRESSURE: 78 MMHG

## 2020-05-20 DIAGNOSIS — J30.9 ALLERGIC RHINITIS, UNSPECIFIED SEASONALITY, UNSPECIFIED TRIGGER: ICD-10-CM

## 2020-05-20 DIAGNOSIS — K21.00 GASTROESOPHAGEAL REFLUX DISEASE WITH ESOPHAGITIS: ICD-10-CM

## 2020-05-20 DIAGNOSIS — F41.9 ANXIETY: ICD-10-CM

## 2020-05-20 DIAGNOSIS — Z00.00 ANNUAL PHYSICAL EXAM: Primary | ICD-10-CM

## 2020-05-20 PROCEDURE — 99203 OFFICE O/P NEW LOW 30 MIN: CPT | Performed by: FAMILY MEDICINE

## 2020-05-20 PROCEDURE — 1036F TOBACCO NON-USER: CPT | Performed by: FAMILY MEDICINE

## 2020-05-20 PROCEDURE — 3008F BODY MASS INDEX DOCD: CPT | Performed by: FAMILY MEDICINE

## 2020-05-20 PROCEDURE — 99385 PREV VISIT NEW AGE 18-39: CPT | Performed by: FAMILY MEDICINE

## 2020-05-20 PROCEDURE — 3008F BODY MASS INDEX DOCD: CPT | Performed by: OBSTETRICS & GYNECOLOGY

## 2020-05-20 RX ORDER — MONTELUKAST SODIUM 10 MG/1
10 TABLET ORAL
Qty: 30 TABLET | Refills: 5 | Status: SHIPPED | OUTPATIENT
Start: 2020-05-20 | End: 2020-08-20

## 2020-05-20 RX ORDER — FAMOTIDINE 40 MG/1
40 TABLET, FILM COATED ORAL DAILY
Qty: 30 TABLET | Refills: 3 | Status: SHIPPED | OUTPATIENT
Start: 2020-05-20 | End: 2020-09-25

## 2020-05-20 RX ORDER — ALPRAZOLAM 0.25 MG/1
0.25 TABLET ORAL 3 TIMES DAILY PRN
Qty: 40 TABLET | Refills: 1 | Status: SHIPPED | OUTPATIENT
Start: 2020-05-20 | End: 2021-08-10 | Stop reason: ALTCHOICE

## 2020-05-22 ENCOUNTER — TELEMEDICINE (OUTPATIENT)
Dept: GASTROENTEROLOGY | Facility: CLINIC | Age: 25
End: 2020-05-22
Payer: COMMERCIAL

## 2020-05-22 DIAGNOSIS — K21.00 GASTROESOPHAGEAL REFLUX DISEASE WITH ESOPHAGITIS: ICD-10-CM

## 2020-05-22 DIAGNOSIS — R10.13 DYSPEPSIA: Primary | ICD-10-CM

## 2020-05-22 PROCEDURE — 99214 OFFICE O/P EST MOD 30 MIN: CPT | Performed by: PHYSICIAN ASSISTANT

## 2020-05-24 ENCOUNTER — HOSPITAL ENCOUNTER (OUTPATIENT)
Dept: RADIOLOGY | Facility: HOSPITAL | Age: 25
Discharge: HOME | End: 2020-05-24
Payer: COMMERCIAL

## 2020-05-24 DIAGNOSIS — R10.13 DYSPEPSIA: ICD-10-CM

## 2020-05-24 DIAGNOSIS — K21.00 GASTROESOPHAGEAL REFLUX DISEASE WITH ESOPHAGITIS: ICD-10-CM

## 2020-05-24 PROCEDURE — 76705 ECHO EXAM OF ABDOMEN: CPT

## 2020-07-12 ENCOUNTER — OFFICE VISIT (OUTPATIENT)
Dept: URGENT CARE | Facility: MEDICAL CENTER | Age: 25
End: 2020-07-12
Payer: COMMERCIAL

## 2020-07-12 VITALS
HEIGHT: 65 IN | OXYGEN SATURATION: 100 % | BODY MASS INDEX: 24.66 KG/M2 | DIASTOLIC BLOOD PRESSURE: 60 MMHG | TEMPERATURE: 97.5 F | WEIGHT: 148 LBS | SYSTOLIC BLOOD PRESSURE: 136 MMHG | HEART RATE: 106 BPM | RESPIRATION RATE: 18 BRPM

## 2020-07-12 DIAGNOSIS — H60.313 DIFFUSE OTITIS EXTERNA OF BOTH EARS, UNSPECIFIED CHRONICITY: Primary | ICD-10-CM

## 2020-07-12 PROCEDURE — 99213 OFFICE O/P EST LOW 20 MIN: CPT | Performed by: PHYSICIAN ASSISTANT

## 2020-07-12 NOTE — PROGRESS NOTES
[facility]        NAME: Lauren Hagan is a 22 y o  female  : 1995    MRN: [id number]  DATE: 2020  TIME: 2:44 PM    Assessment and Plan   Diffuse otitis externa of both ears, unspecified chronicity [H60 313]  1  Diffuse otitis externa of both ears, unspecified chronicity  neomycin-polymyxin-hydrocortisone (CORTISPORIN) otic solution         Patient Instructions     1  Use Corticporin 3 drops 3x daily into both ears  2  Motrin as needed for pain  3  Follow up with PCP in 3-5 days if symptoms worsen or persist        Chief Complaint     Chief Complaint   Patient presents with    Earache     Pt  with b/l ear pain for the past week  History of Present Illness       Palomo Rucker is a 22-year-old female presents with a 1 week history of bilateral ear pain  Patient reports no fever, change in hearing or ear drainage  She also states she had middle ear effusions in both ears 1 month prior and was started on Singulair by her PCP  Review of Systems   Review of Systems   Constitutional: Negative  HENT: Positive for ear pain  Negative for ear discharge  Respiratory: Negative  Gastrointestinal: Negative            Current Medications       Current Outpatient Medications:     ALPRAZolam (XANAX) 0 25 mg tablet, Take 1 tablet (0 25 mg total) by mouth 3 (three) times a day as needed for anxiety, Disp: 40 tablet, Rfl: 1    calcium carbonate (TUMS) 500 mg chewable tablet, Chew 1 tablet as needed for indigestion or heartburn, Disp: , Rfl:     fluticasone (FLONASE) 50 mcg/act nasal spray, 2 sprays into each nostril daily, Disp: , Rfl:     levonorgestrel (MIRENA) 20 MCG/24HR IUD, 1 each by Intrauterine route once, Disp: , Rfl:     Loratadine (CLARITIN PO), Take by mouth, Disp: , Rfl:     montelukast (SINGULAIR) 10 mg tablet, Take 1 tablet (10 mg total) by mouth daily at bedtime, Disp: 30 tablet, Rfl: 5    OMEPRAZOLE PO, Take by mouth, Disp: , Rfl:     famotidine (PEPCID) 40 MG tablet, Take 1 tablet (40 mg total) by mouth daily, Disp: 30 tablet, Rfl: 3    montelukast (SINGULAIR) 10 mg tablet, Take 1 tablet by mouth daily, Disp: , Rfl:     neomycin-polymyxin-hydrocortisone (CORTISPORIN) otic solution, Administer 4 drops into both ears every 8 (eight) hours for 7 days, Disp: 10 mL, Rfl: 0    Current Allergies     Allergies as of 07/12/2020    (No Known Allergies)            The following portions of the patient's history were reviewed and updated as appropriate: allergies, current medications, past family history, past medical history, past social history, past surgical history and problem list      Past Medical History:   Diagnosis Date    GERD (gastroesophageal reflux disease)     Seasonal allergies        Past Surgical History:   Procedure Laterality Date    UPPER GASTROINTESTINAL ENDOSCOPY      WISDOM TOOTH EXTRACTION         Family History   Problem Relation Age of Onset    No Known Problems Mother     No Known Problems Father     No Known Problems Sister     No Known Problems Brother     No Known Problems Maternal Grandmother     No Known Problems Maternal Grandfather     No Known Problems Paternal Grandmother     No Known Problems Paternal Grandfather     Breast cancer Paternal Aunt          Medications have been verified  Objective   /60   Pulse (!) 106   Temp 97 5 °F (36 4 °C) (Temporal)   Resp 18   Ht 5' 5" (1 651 m)   Wt 67 1 kg (148 lb)   SpO2 100%   BMI 24 63 kg/m²        Physical Exam     Physical Exam   Constitutional: She appears well-developed and well-nourished  No distress  HENT:   Head: Normocephalic and atraumatic  Right Ear: Tympanic membrane normal    Left Ear: Tympanic membrane normal    Ears:    Nose: Nose normal    Mouth/Throat: Uvula is midline, oropharynx is clear and moist and mucous membranes are normal    Cardiovascular: Normal rate, regular rhythm and normal heart sounds  No murmur heard    Pulmonary/Chest: Effort normal and breath sounds normal

## 2020-07-12 NOTE — PATIENT INSTRUCTIONS
1  Use Corticporin 3 drops 3x daily into both ears  2  Motrin as needed for pain  3   Follow up with PCP in 3-5 days if symptoms worsen or persist

## 2020-08-04 ENCOUNTER — TELEMEDICINE (OUTPATIENT)
Dept: FAMILY MEDICINE CLINIC | Facility: CLINIC | Age: 25
End: 2020-08-04
Payer: COMMERCIAL

## 2020-08-04 DIAGNOSIS — F41.9 ANXIETY: Primary | ICD-10-CM

## 2020-08-04 PROCEDURE — 99214 OFFICE O/P EST MOD 30 MIN: CPT | Performed by: NURSE PRACTITIONER

## 2020-08-04 PROCEDURE — 1036F TOBACCO NON-USER: CPT | Performed by: NURSE PRACTITIONER

## 2020-08-04 RX ORDER — ESCITALOPRAM OXALATE 10 MG/1
10 TABLET ORAL DAILY
Qty: 90 TABLET | Refills: 1 | Status: SHIPPED | OUTPATIENT
Start: 2020-08-04 | End: 2021-02-18

## 2020-08-05 NOTE — PROGRESS NOTES
Assessment/Plan:     Diagnoses and all orders for this visit:    Anxiety  -     escitalopram (LEXAPRO) 10 mg tablet; Take 1 tablet (10 mg total) by mouth daily        Discussed with patient plan to restart her on escitalopram 5 mg daily with an increase in one week to 10 mg daily  Discussed with patient that medication needs to build-up until full effect of medication felt - approximately one month  Patient instructed to follow up with primary care provider in one month to discuss efficiency of the medication or sooner if needed  Patient instructed to call for problems or concerns in the interim    Subjective:      Patient ID: Ruby Gonzales is a 22 y o  female  22 y  o female is following up on a conversation that she had with her primary care provider on 05/20/2020  She reports that she was on escitalopram (Lexapro) in the past for anxiety but was weaned off because she thought the anxiety had improved  She is having an increase in her anxiety level again and wanted to discuss possible medication choices she as to manage  she states that her  and her are thinking about pregnancy in the future so interested in a medication that can weaned off easily  She did feel the Lexapro was helpful and does not think there is a depression component involved             Family History   Problem Relation Age of Onset    No Known Problems Mother     No Known Problems Father     No Known Problems Sister     No Known Problems Brother     No Known Problems Maternal Grandmother     No Known Problems Maternal Grandfather     No Known Problems Paternal Grandmother     No Known Problems Paternal Grandfather     Breast cancer Paternal Aunt      Social History     Socioeconomic History    Marital status: /Civil Union     Spouse name: Not on file    Number of children: Not on file    Years of education: Not on file    Highest education level: Not on file   Occupational History    Not on file   Social Needs  Financial resource strain: Not on file    Food insecurity     Worry: Not on file     Inability: Not on file   aiHit needs     Medical: Not on file     Non-medical: Not on file   Tobacco Use    Smoking status: Never Smoker    Smokeless tobacco: Never Used   Substance and Sexual Activity    Alcohol use: Yes     Frequency: 2-3 times a week     Drinks per session: 1 or 2     Binge frequency: Never    Drug use: Never    Sexual activity: Yes     Partners: Male     Birth control/protection: I U D     Lifestyle    Physical activity     Days per week: Not on file     Minutes per session: Not on file    Stress: Not on file   Relationships    Social connections     Talks on phone: Not on file     Gets together: Not on file     Attends Worship service: Not on file     Active member of club or organization: Not on file     Attends meetings of clubs or organizations: Not on file     Relationship status: Not on file    Intimate partner violence     Fear of current or ex partner: Not on file     Emotionally abused: Not on file     Physically abused: Not on file     Forced sexual activity: Not on file   Other Topics Concern    Not on file   Social History Narrative    Not on file     E-Cigarette/Vaping    E-Cigarette Use Never User      E-Cigarette/Vaping Substances     Past Medical History:   Diagnosis Date    GERD (gastroesophageal reflux disease)     Seasonal allergies      Past Surgical History:   Procedure Laterality Date    UPPER GASTROINTESTINAL ENDOSCOPY      WISDOM TOOTH EXTRACTION       No Known Allergies    Current Outpatient Medications:     ALPRAZolam (XANAX) 0 25 mg tablet, Take 1 tablet (0 25 mg total) by mouth 3 (three) times a day as needed for anxiety, Disp: 40 tablet, Rfl: 1    calcium carbonate (TUMS) 500 mg chewable tablet, Chew 1 tablet as needed for indigestion or heartburn, Disp: , Rfl:     escitalopram (LEXAPRO) 10 mg tablet, Take 1 tablet (10 mg total) by mouth daily, Disp: 90 tablet, Rfl: 1    famotidine (PEPCID) 40 MG tablet, Take 1 tablet (40 mg total) by mouth daily, Disp: 30 tablet, Rfl: 3    fluticasone (FLONASE) 50 mcg/act nasal spray, 2 sprays into each nostril daily, Disp: , Rfl:     levonorgestrel (MIRENA) 20 MCG/24HR IUD, 1 each by Intrauterine route once, Disp: , Rfl:     Loratadine (CLARITIN PO), Take by mouth, Disp: , Rfl:     montelukast (SINGULAIR) 10 mg tablet, Take 1 tablet by mouth daily, Disp: , Rfl:     montelukast (SINGULAIR) 10 mg tablet, Take 1 tablet (10 mg total) by mouth daily at bedtime, Disp: 30 tablet, Rfl: 5    neomycin-polymyxin-hydrocortisone (CORTISPORIN) otic solution, Administer 4 drops into both ears every 8 (eight) hours for 7 days, Disp: 10 mL, Rfl: 0    OMEPRAZOLE PO, Take by mouth, Disp: , Rfl:     Review of Systems   Constitutional: Negative for activity change, appetite change and unexpected weight change  HENT: Negative  Eyes: Negative for visual disturbance  Respiratory: Negative for cough, chest tightness, shortness of breath and wheezing  Cardiovascular: Negative for chest pain, palpitations and leg swelling  Gastrointestinal: Negative for abdominal pain, diarrhea and nausea  Endocrine: Negative for polydipsia and polyuria  Genitourinary: Negative  Musculoskeletal: Negative  Skin: Negative  Allergic/Immunologic: Negative  Neurological: Negative for dizziness, weakness, light-headedness and headaches  Hematological: Negative  Psychiatric/Behavioral: The patient is nervous/anxious  Objective: There were no vitals taken for this visit  Physical Exam   Constitutional: She is oriented to person, place, and time  She appears well-developed  She is cooperative  No distress  HENT:   Head: Normocephalic and atraumatic  Right Ear: External ear normal    Left Ear: External ear normal    Eyes: Pupils are equal, round, and reactive to light   Conjunctivae and lids are normal    Neck: Trachea normal    Cardiovascular: Normal rate and regular rhythm  Pulmonary/Chest: Effort normal  No respiratory distress  She has no wheezes  Abdominal: Normal appearance  Neurological: She is alert and oriented to person, place, and time  Psychiatric: Her speech is normal and behavior is normal  Her mood appears anxious  She is not agitated, not aggressive and not hyperactive  She does not exhibit a depressed mood

## 2020-08-05 NOTE — PROGRESS NOTES
Virtual Regular Visit      Assessment/Plan:    Problem List Items Addressed This Visit        Other    Anxiety - Primary    Relevant Medications    escitalopram (LEXAPRO) 10 mg tablet         Reason for visit is   Chief Complaint   Patient presents with    Virtual Regular Visit        Encounter provider Nicol Rodríguez    Provider [place] at [address] [place], [place] [address] [id number] [address]      Recent Visits  Date Type Provider Dept   08/04/20 Ernestina Fernando 429 recent visits within past 7 days and meeting all other requirements     Future Appointments  No visits were found meeting these conditions  Showing future appointments within next 150 days and meeting all other requirements        The patient was identified by name and date of birth  Fariba Hein was informed that this is a telemedicine visit and that the visit is being conducted through [employer] and patient was informed that this is not a secure, HIPAA-complaint platform  She agrees to proceed     My office door was closed  No one else was in the room  She acknowledged consent and understanding of privacy and security of the video platform  The patient has agreed to participate and understands they can discontinue the visit at any time  Patient is aware this is a billable service  Subjective    Fariba Hein is a 22 y  o female that is following up on a conversation that she had with her primary care provider on 05/20/2020  She reports that she was on escitalopram (Lexapro) in the past for anxiety but was weaned off because she thought the anxiety had improved  She is having an increase in her anxiety level again and wanted to discuss possible medication choices she as to manage  she states that her  and her are thinking about pregnancy in the future so interested in a medication that can weaned off easily   She did feel the Lexapro was helpful and does not think there is a depression component involved  Past Medical History:   Diagnosis Date    GERD (gastroesophageal reflux disease)     Seasonal allergies        Past Surgical History:   Procedure Laterality Date    UPPER GASTROINTESTINAL ENDOSCOPY      WISDOM TOOTH EXTRACTION         Current Outpatient Medications   Medication Sig Dispense Refill    ALPRAZolam (XANAX) 0 25 mg tablet Take 1 tablet (0 25 mg total) by mouth 3 (three) times a day as needed for anxiety 40 tablet 1    calcium carbonate (TUMS) 500 mg chewable tablet Chew 1 tablet as needed for indigestion or heartburn      escitalopram (LEXAPRO) 10 mg tablet Take 1 tablet (10 mg total) by mouth daily 90 tablet 1    famotidine (PEPCID) 40 MG tablet Take 1 tablet (40 mg total) by mouth daily 30 tablet 3    fluticasone (FLONASE) 50 mcg/act nasal spray 2 sprays into each nostril daily      levonorgestrel (MIRENA) 20 MCG/24HR IUD 1 each by Intrauterine route once      Loratadine (CLARITIN PO) Take by mouth      montelukast (SINGULAIR) 10 mg tablet Take 1 tablet by mouth daily      montelukast (SINGULAIR) 10 mg tablet Take 1 tablet (10 mg total) by mouth daily at bedtime 30 tablet 5    neomycin-polymyxin-hydrocortisone (CORTISPORIN) otic solution Administer 4 drops into both ears every 8 (eight) hours for 7 days 10 mL 0    OMEPRAZOLE PO Take by mouth       No current facility-administered medications for this visit  No Known Allergies    Review of Systems   Constitutional: Negative for activity change, appetite change and unexpected weight change  HENT: Negative  Eyes: Negative for visual disturbance  Respiratory: Negative for cough, chest tightness, shortness of breath and wheezing  Cardiovascular: Negative for chest pain, palpitations and leg swelling  Gastrointestinal: Negative for abdominal pain, diarrhea and nausea  Endocrine: Negative for polydipsia and polyuria  Genitourinary: Negative  Musculoskeletal: Negative  Skin: Negative  Allergic/Immunologic: Negative  Neurological: Negative for dizziness, weakness, light-headedness and headaches  Hematological: Negative  Psychiatric/Behavioral: The patient is nervous/anxious  Video Exam    There were no vitals filed for this visit  Physical Exam   Constitutional: She is oriented to person, place, and time  She appears well-developed  She is cooperative  No distress  HENT:   Head: Normocephalic and atraumatic  Right Ear: External ear normal    Left Ear: External ear normal    Eyes: Pupils are equal, round, and reactive to light  Conjunctivae and lids are normal    Neck: Trachea normal    Cardiovascular: Normal rate and regular rhythm  Pulmonary/Chest: Effort normal  No respiratory distress  She has no wheezes  Abdominal: Normal appearance  Neurological: She is alert and oriented to person, place, and time  Psychiatric: Her speech is normal and behavior is normal  Her mood appears anxious  She is not agitated, not aggressive and not hyperactive  She does not exhibit a depressed mood  Diagnoses and all orders for this visit:    Anxiety  -     escitalopram (LEXAPRO) 10 mg tablet; Take 1 tablet (10 mg total) by mouth daily       Discussed with patient plan to restart her on escitalopram 5 mg daily with an increase in one week to 10 mg daily  Discussed with patient that medication needs to build-up until full effect of medication felt - approximately one month  Patient instructed to follow up with primary care provider in one month to discuss efficiency of the medication or sooner if needed  Patient instructed to call for problems or concerns in the interim    I spent 20 minutes directly with the patient during this visit      VIRTUAL VISIT [address] acknowledges that she has consented to an online visit or consultation   She understands that the online visit is based solely on information provided by her, and that, in the absence of a face-to-face physical evaluation by the physician, the diagnosis she receives is both limited and provisional in terms of accuracy and completeness  This is not intended to replace a full medical face-to-face evaluation by the physician  Laurel Singh understands and accepts these terms

## 2020-08-20 ENCOUNTER — OFFICE VISIT (OUTPATIENT)
Dept: OBGYN CLINIC | Facility: CLINIC | Age: 25
End: 2020-08-20
Payer: COMMERCIAL

## 2020-08-20 VITALS
DIASTOLIC BLOOD PRESSURE: 72 MMHG | SYSTOLIC BLOOD PRESSURE: 114 MMHG | WEIGHT: 146 LBS | TEMPERATURE: 98.1 F | BODY MASS INDEX: 24.3 KG/M2

## 2020-08-20 DIAGNOSIS — N60.12 FIBROCYSTIC BREAST CHANGES OF BOTH BREASTS: Primary | ICD-10-CM

## 2020-08-20 DIAGNOSIS — N60.11 FIBROCYSTIC BREAST CHANGES OF BOTH BREASTS: Primary | ICD-10-CM

## 2020-08-20 PROCEDURE — 99213 OFFICE O/P EST LOW 20 MIN: CPT | Performed by: OBSTETRICS & GYNECOLOGY

## 2020-08-20 PROCEDURE — 1036F TOBACCO NON-USER: CPT | Performed by: OBSTETRICS & GYNECOLOGY

## 2020-08-21 NOTE — PROGRESS NOTES
Assessment/Plan:    Fibrocystic breast changes of both breasts    Reviewed with pt that her breast exam appears normal   Recommend continued surveillance   If worsening or still present will do an ultrasoun    Subjective:      Patient ID: Ritika Ghotra is a 22 y o  female  HPI  25y  presents with the complaint of a breast lump that has been present for 2 months  It is not tender  It has not grown in size  She denies breast discharge  She denies a family history of breast disease  The following portions of the patient's history were reviewed and updated as appropriate: allergies, current medications, past family history, past medical history, past social history, past surgical history and problem list     Review of Systems   Constitutional: Negative  HENT: Negative  Respiratory: Negative  Cardiovascular: Negative  Gastrointestinal: Negative  Genitourinary: Negative  Neurological: Negative  Psychiatric/Behavioral: Negative  Objective:      /72   Temp 98 1 °F (36 7 °C)   Wt 66 2 kg (146 lb)   LMP 2020 (Exact Date)   BMI 24 30 kg/m²          Physical Exam  Vitals signs and nursing note reviewed  Constitutional:       Appearance: Normal appearance  HENT:      Head: Normocephalic and atraumatic  Eyes:      Extraocular Movements: Extraocular movements intact  Conjunctiva/sclera: Conjunctivae normal       Pupils: Pupils are equal, round, and reactive to light  Chest:      Breasts:         Right: Normal  No swelling, bleeding, inverted nipple, mass, nipple discharge, skin change or tenderness  Left: Normal  No swelling, bleeding, inverted nipple, mass, nipple discharge, skin change or tenderness  Genitourinary:     Labia:         Right: No rash, tenderness or lesion  Left: No rash, tenderness or lesion  Urethra: No prolapse or urethral pain  Skin:     General: Skin is warm and dry        Coloration: Skin is not jaundiced or pale    Neurological:      General: No focal deficit present  Mental Status: She is alert and oriented to person, place, and time

## 2020-09-25 ENCOUNTER — PROCEDURE VISIT (OUTPATIENT)
Dept: OBGYN CLINIC | Facility: CLINIC | Age: 25
End: 2020-09-25
Payer: COMMERCIAL

## 2020-09-25 VITALS
WEIGHT: 143 LBS | HEIGHT: 65 IN | DIASTOLIC BLOOD PRESSURE: 62 MMHG | SYSTOLIC BLOOD PRESSURE: 102 MMHG | TEMPERATURE: 98.7 F | BODY MASS INDEX: 23.82 KG/M2

## 2020-09-25 DIAGNOSIS — Z30.430 ENCOUNTER FOR IUD INSERTION: Primary | ICD-10-CM

## 2020-09-25 LAB — SL AMB POCT URINE HCG: NEGATIVE

## 2020-09-25 PROCEDURE — 58300 INSERT INTRAUTERINE DEVICE: CPT | Performed by: OBSTETRICS & GYNECOLOGY

## 2020-09-25 PROCEDURE — 58301 REMOVE INTRAUTERINE DEVICE: CPT | Performed by: OBSTETRICS & GYNECOLOGY

## 2020-09-25 PROCEDURE — 81025 URINE PREGNANCY TEST: CPT | Performed by: OBSTETRICS & GYNECOLOGY

## 2020-09-25 NOTE — PROGRESS NOTES
Iud removal    Date/Time: 9/25/2020 12:32 PM  Performed by: Marilou Rhodes MD  Authorized by: Marilou Rhodes MD     Consent:     Consent obtained:  Written    Consent given by:  Patient    Procedure risks and benefits discussed: yes      Patient agrees, verbalizes understanding, and wants to proceed: yes    Procedure:     Removed with no complications: yes      Other reason for removal:  Expiration    Iud insertions    Date/Time: 9/25/2020 12:33 PM  Performed by: Marilou Rhodes MD  Authorized by: Marilou Rhodes MD     Consent:     Consent obtained:  Written    Consent given by:  Patient    Procedure risks and benefits discussed: yes      Patient questions answered: yes      Patient agrees, verbalizes understanding, and wants to proceed: yes    Procedure:     Negative urine pregnancy test: yes      Cervix cleaned and prepped: yes      Speculum placed in vagina: yes      Tenaculum applied to cervix: yes      Uterus sounded: yes      Uterus sound depth (cm):  6 5    IUD inserted with no complications: yes      IUD type: [facility]      Strings trimmed: yes (4cm)    Post-procedure:     Patient tolerated procedure well: yes      Patient will follow up after next period: yes

## 2020-10-01 ENCOUNTER — TELEPHONE (OUTPATIENT)
Dept: OBGYN CLINIC | Facility: CLINIC | Age: 25
End: 2020-10-01

## 2020-10-01 DIAGNOSIS — N63.20 LEFT BREAST LUMP: Primary | ICD-10-CM

## 2020-10-13 ENCOUNTER — HOSPITAL ENCOUNTER (OUTPATIENT)
Dept: ULTRASOUND IMAGING | Facility: CLINIC | Age: 25
Discharge: HOME | End: 2020-10-13
Payer: COMMERCIAL

## 2020-10-13 VITALS — WEIGHT: 143 LBS | BODY MASS INDEX: 23.82 KG/M2 | HEIGHT: 65 IN

## 2020-10-13 DIAGNOSIS — N63.20 LEFT BREAST LUMP: ICD-10-CM

## 2020-10-13 DIAGNOSIS — D24.2 FIBROADENOMA OF LEFT BREAST: Primary | ICD-10-CM

## 2020-10-13 PROCEDURE — 76642 ULTRASOUND BREAST LIMITED: CPT

## 2020-10-29 ENCOUNTER — ANNUAL EXAM (OUTPATIENT)
Dept: OBGYN CLINIC | Facility: CLINIC | Age: 25
End: 2020-10-29
Payer: COMMERCIAL

## 2020-10-29 VITALS
DIASTOLIC BLOOD PRESSURE: 62 MMHG | HEIGHT: 65 IN | SYSTOLIC BLOOD PRESSURE: 102 MMHG | BODY MASS INDEX: 23.82 KG/M2 | TEMPERATURE: 98.5 F | WEIGHT: 143 LBS

## 2020-10-29 DIAGNOSIS — Z01.419 WOMEN'S ANNUAL ROUTINE GYNECOLOGICAL EXAMINATION: Primary | ICD-10-CM

## 2020-10-29 PROBLEM — D24.2 FIBROADENOMA OF LEFT BREAST: Status: ACTIVE | Noted: 2020-10-29

## 2020-10-29 PROCEDURE — 3008F BODY MASS INDEX DOCD: CPT | Performed by: NURSE PRACTITIONER

## 2020-10-29 PROCEDURE — 99395 PREV VISIT EST AGE 18-39: CPT | Performed by: OBSTETRICS & GYNECOLOGY

## 2020-11-18 ENCOUNTER — TELEPHONE (OUTPATIENT)
Dept: FAMILY MEDICINE CLINIC | Facility: CLINIC | Age: 25
End: 2020-11-18

## 2020-11-18 ENCOUNTER — APPOINTMENT (OUTPATIENT)
Dept: RADIOLOGY | Age: 25
End: 2020-11-18
Payer: COMMERCIAL

## 2020-11-18 ENCOUNTER — TELEMEDICINE (OUTPATIENT)
Dept: FAMILY MEDICINE CLINIC | Facility: CLINIC | Age: 25
End: 2020-11-18
Payer: COMMERCIAL

## 2020-11-18 DIAGNOSIS — M25.562 ACUTE PAIN OF LEFT KNEE: ICD-10-CM

## 2020-11-18 DIAGNOSIS — M25.562 ACUTE PAIN OF LEFT KNEE: Primary | ICD-10-CM

## 2020-11-18 PROCEDURE — 99213 OFFICE O/P EST LOW 20 MIN: CPT | Performed by: NURSE PRACTITIONER

## 2020-11-18 PROCEDURE — 1036F TOBACCO NON-USER: CPT | Performed by: NURSE PRACTITIONER

## 2020-11-18 PROCEDURE — 73562 X-RAY EXAM OF KNEE 3: CPT

## 2020-11-18 RX ORDER — METHYLPREDNISOLONE 4 MG/1
TABLET ORAL
Qty: 21 EACH | Refills: 0 | Status: SHIPPED | OUTPATIENT
Start: 2020-11-18 | End: 2021-08-10 | Stop reason: ALTCHOICE

## 2020-11-20 ENCOUNTER — TELEPHONE (OUTPATIENT)
Dept: FAMILY MEDICINE CLINIC | Facility: CLINIC | Age: 25
End: 2020-11-20

## 2020-11-20 DIAGNOSIS — S83.242A ACUTE MEDIAL MENISCUS TEAR OF LEFT KNEE, INITIAL ENCOUNTER: Primary | ICD-10-CM

## 2020-12-01 DIAGNOSIS — M67.864 TENDINOSIS OF LEFT KNEE: Primary | ICD-10-CM

## 2020-12-10 ENCOUNTER — EVALUATION (OUTPATIENT)
Dept: PHYSICAL THERAPY | Facility: REHABILITATION | Age: 25
End: 2020-12-10
Payer: COMMERCIAL

## 2020-12-10 DIAGNOSIS — M67.864 TENDINOSIS OF LEFT KNEE: Primary | ICD-10-CM

## 2020-12-10 PROCEDURE — 97110 THERAPEUTIC EXERCISES: CPT | Performed by: PHYSICAL THERAPIST

## 2020-12-10 PROCEDURE — 97161 PT EVAL LOW COMPLEX 20 MIN: CPT | Performed by: PHYSICAL THERAPIST

## 2020-12-17 ENCOUNTER — APPOINTMENT (OUTPATIENT)
Dept: PHYSICAL THERAPY | Facility: REHABILITATION | Age: 25
End: 2020-12-17
Payer: COMMERCIAL

## 2020-12-22 ENCOUNTER — OFFICE VISIT (OUTPATIENT)
Dept: PHYSICAL THERAPY | Facility: REHABILITATION | Age: 25
End: 2020-12-22
Payer: COMMERCIAL

## 2020-12-22 DIAGNOSIS — M67.864 TENDINOSIS OF LEFT KNEE: Primary | ICD-10-CM

## 2020-12-22 PROCEDURE — 97140 MANUAL THERAPY 1/> REGIONS: CPT | Performed by: PHYSICAL THERAPIST

## 2020-12-22 PROCEDURE — 97112 NEUROMUSCULAR REEDUCATION: CPT | Performed by: PHYSICAL THERAPIST

## 2020-12-22 PROCEDURE — 97110 THERAPEUTIC EXERCISES: CPT | Performed by: PHYSICAL THERAPIST

## 2020-12-23 ENCOUNTER — TELEMEDICINE (OUTPATIENT)
Dept: FAMILY MEDICINE CLINIC | Facility: CLINIC | Age: 25
End: 2020-12-23
Payer: COMMERCIAL

## 2020-12-23 DIAGNOSIS — J30.9 ALLERGIC RHINITIS, UNSPECIFIED SEASONALITY, UNSPECIFIED TRIGGER: ICD-10-CM

## 2020-12-23 DIAGNOSIS — H92.02 LEFT EAR PAIN: Primary | ICD-10-CM

## 2020-12-23 PROCEDURE — 99213 OFFICE O/P EST LOW 20 MIN: CPT | Performed by: NURSE PRACTITIONER

## 2020-12-23 PROCEDURE — 1036F TOBACCO NON-USER: CPT | Performed by: NURSE PRACTITIONER

## 2020-12-23 RX ORDER — MONTELUKAST SODIUM 10 MG/1
10 TABLET ORAL DAILY
Qty: 30 TABLET | Refills: 5 | Status: SHIPPED | OUTPATIENT
Start: 2020-12-23 | End: 2021-06-21

## 2020-12-23 RX ORDER — AMOXICILLIN 500 MG/1
500 TABLET, FILM COATED ORAL EVERY 8 HOURS
Qty: 30 TABLET | Refills: 0 | Status: SHIPPED | OUTPATIENT
Start: 2020-12-23 | End: 2021-01-02

## 2020-12-29 ENCOUNTER — OFFICE VISIT (OUTPATIENT)
Dept: PHYSICAL THERAPY | Facility: REHABILITATION | Age: 25
End: 2020-12-29
Payer: COMMERCIAL

## 2020-12-29 DIAGNOSIS — M67.864 TENDINOSIS OF LEFT KNEE: Primary | ICD-10-CM

## 2020-12-29 PROCEDURE — 97112 NEUROMUSCULAR REEDUCATION: CPT | Performed by: PHYSICAL THERAPIST

## 2020-12-29 PROCEDURE — 97110 THERAPEUTIC EXERCISES: CPT | Performed by: PHYSICAL THERAPIST

## 2020-12-29 PROCEDURE — 97140 MANUAL THERAPY 1/> REGIONS: CPT | Performed by: PHYSICAL THERAPIST

## 2021-01-12 ENCOUNTER — EVALUATION (OUTPATIENT)
Dept: PHYSICAL THERAPY | Facility: REHABILITATION | Age: 26
End: 2021-01-12
Payer: COMMERCIAL

## 2021-01-12 DIAGNOSIS — M67.864 TENDINOSIS OF LEFT KNEE: Primary | ICD-10-CM

## 2021-01-12 PROCEDURE — 97140 MANUAL THERAPY 1/> REGIONS: CPT | Performed by: PHYSICAL THERAPIST

## 2021-01-12 PROCEDURE — 97112 NEUROMUSCULAR REEDUCATION: CPT | Performed by: PHYSICAL THERAPIST

## 2021-01-12 NOTE — PROGRESS NOTES
Daily Note     Today's date: 2021  Patient name: Maida Ivy  : 1995  MRN: [id number]  Referring provider: APPLE Jara  Dx:   Encounter Diagnosis     ICD-10-CM    1  Tendinosis of left knee  [id number]                   Subjective: Reports about an 80% improvement upon arrival         Objective: See treatment diary below      Assessment: Tolerated treatment well  Patient will be D/C to HEP  Plan: D/C to HEP focusing on improving L knee stability  Precautions: anxiety       Manuals            L knee ROM CW 8'  CW 5'  np          L patellar tendon IASTM  CW 5'  CW 5' medial ham np                                    Neuro Re-Ed             QS 20x5"  np           SLR  3x10  np p!             Side steps/monster walks TB  4 laps BTB np          Squats on foam    2x20           Standing TKE [place]    2x15 ea R20                                     Ther Ex             Nustep  5' L6             Bridges w/ TB push out 20x5" GTB np           SL clamshells  20x GTB  np           Prone knee flexion 10x5"  np           SLS  2x45" biodex foam 3x1' blk TB          Bike   8'  5'           Bridge w/ walkout   20x ea            Lunges    2x10           Standing hip 3-way on foam   15x ea BTB 15x ea BTB          Ther Activity                                       Gait Training                                       Modalities

## 2021-01-15 ENCOUNTER — HOSPITAL ENCOUNTER (OUTPATIENT)
Dept: RADIOLOGY | Age: 26
Discharge: HOME | End: 2021-01-15
Payer: COMMERCIAL

## 2021-01-15 ENCOUNTER — LAB (OUTPATIENT)
Dept: LAB | Age: 26
End: 2021-01-15
Payer: COMMERCIAL

## 2021-01-15 ENCOUNTER — TRANSCRIBE ORDERS (OUTPATIENT)
Dept: ADMINISTRATIVE | Age: 26
End: 2021-01-15

## 2021-01-15 DIAGNOSIS — H92.03 EAR PAIN, BILATERAL: ICD-10-CM

## 2021-01-15 DIAGNOSIS — E01.0 THYROMEGALY: ICD-10-CM

## 2021-01-15 DIAGNOSIS — R42 DIZZINESS: ICD-10-CM

## 2021-01-15 LAB
ALBUMIN SERPL BCP-MCNC: 4.2 G/DL (ref 3.5–5)
ALP SERPL-CCNC: 49 U/L (ref 46–116)
ALT SERPL W P-5'-P-CCNC: 27 U/L (ref 12–78)
ANION GAP SERPL CALCULATED.3IONS-SCNC: 4 MMOL/L (ref 4–13)
AST SERPL W P-5'-P-CCNC: 16 U/L (ref 5–45)
BASOPHILS # BLD AUTO: 0.02 THOUSANDS/ΜL (ref 0–0.1)
BASOPHILS NFR BLD AUTO: 0 % (ref 0–1)
BILIRUB SERPL-MCNC: 0.7 MG/DL (ref 0.2–1)
BUN SERPL-MCNC: 10 MG/DL (ref 5–25)
CALCIUM SERPL-MCNC: 9.1 MG/DL (ref 8.3–10.1)
CHLORIDE SERPL-SCNC: 107 MMOL/L (ref 100–108)
CO2 SERPL-SCNC: 26 MMOL/L (ref 21–32)
CREAT SERPL-MCNC: 0.6 MG/DL (ref 0.6–1.3)
EOSINOPHIL # BLD AUTO: 0.1 THOUSAND/ΜL (ref 0–0.61)
EOSINOPHIL NFR BLD AUTO: 2 % (ref 0–6)
ERYTHROCYTE [DISTWIDTH] IN BLOOD BY AUTOMATED COUNT: 11.8 % (ref 11.6–15.1)
GFR SERPL CREATININE-BSD FRML MDRD: 127 ML/MIN/1.73SQ M
GLUCOSE P FAST SERPL-MCNC: 81 MG/DL (ref 65–99)
HCT VFR BLD AUTO: 40.4 % (ref 34.8–46.1)
HGB BLD-MCNC: 13.5 G/DL (ref 11.5–15.4)
IMM GRANULOCYTES # BLD AUTO: 0 THOUSAND/UL (ref 0–0.2)
IMM GRANULOCYTES NFR BLD AUTO: 0 % (ref 0–2)
LYMPHOCYTES # BLD AUTO: 1.77 THOUSANDS/ΜL (ref 0.6–4.47)
LYMPHOCYTES NFR BLD AUTO: 36 % (ref 14–44)
MCH RBC QN AUTO: 32.4 PG (ref 26.8–34.3)
MCHC RBC AUTO-ENTMCNC: 33.4 G/DL (ref 31.4–37.4)
MCV RBC AUTO: 97 FL (ref 82–98)
MONOCYTES # BLD AUTO: 0.38 THOUSAND/ΜL (ref 0.17–1.22)
MONOCYTES NFR BLD AUTO: 8 % (ref 4–12)
NEUTROPHILS # BLD AUTO: 2.64 THOUSANDS/ΜL (ref 1.85–7.62)
NEUTS SEG NFR BLD AUTO: 54 % (ref 43–75)
NRBC BLD AUTO-RTO: 0 /100 WBCS
PLATELET # BLD AUTO: 210 THOUSANDS/UL (ref 149–390)
PMV BLD AUTO: 10.7 FL (ref 8.9–12.7)
POTASSIUM SERPL-SCNC: 4.4 MMOL/L (ref 3.5–5.3)
PROT SERPL-MCNC: 7.8 G/DL (ref 6.4–8.2)
RBC # BLD AUTO: 4.17 MILLION/UL (ref 3.81–5.12)
SODIUM SERPL-SCNC: 137 MMOL/L (ref 136–145)
TSH SERPL DL<=0.05 MIU/L-ACNC: 1.01 UIU/ML (ref 0.36–3.74)
WBC # BLD AUTO: 4.91 THOUSAND/UL (ref 4.31–10.16)

## 2021-01-15 PROCEDURE — 76536 US EXAM OF HEAD AND NECK: CPT

## 2021-01-15 PROCEDURE — 85025 COMPLETE CBC W/AUTO DIFF WBC: CPT

## 2021-01-15 PROCEDURE — 80053 COMPREHEN METABOLIC PANEL: CPT

## 2021-01-15 PROCEDURE — 84443 ASSAY THYROID STIM HORMONE: CPT

## 2021-01-15 PROCEDURE — 36415 COLL VENOUS BLD VENIPUNCTURE: CPT

## 2021-02-09 DIAGNOSIS — E01.0 THYROMEGALY: ICD-10-CM

## 2021-02-09 DIAGNOSIS — E06.0 ACUTE THYROIDITIS: Primary | ICD-10-CM

## 2021-02-12 ENCOUNTER — LAB (OUTPATIENT)
Dept: LAB | Age: 26
End: 2021-02-12
Payer: COMMERCIAL

## 2021-02-12 DIAGNOSIS — E01.0 THYROMEGALY: ICD-10-CM

## 2021-02-12 PROCEDURE — 84432 ASSAY OF THYROGLOBULIN: CPT

## 2021-02-12 PROCEDURE — 86800 THYROGLOBULIN ANTIBODY: CPT

## 2021-02-13 LAB
THYROGLOB AB SERPL-ACNC: <1 IU/ML (ref 0–0.9)
THYROGLOB SERPL-MCNC: 6.8 NG/ML (ref 1.5–38.5)

## 2021-02-18 DIAGNOSIS — F41.9 ANXIETY: ICD-10-CM

## 2021-02-18 RX ORDER — ESCITALOPRAM OXALATE 10 MG/1
TABLET ORAL
Qty: 90 TABLET | Refills: 1 | Status: SHIPPED | OUTPATIENT
Start: 2021-02-18 | End: 2021-06-16

## 2021-04-20 ENCOUNTER — HOSPITAL ENCOUNTER (OUTPATIENT)
Dept: ULTRASOUND IMAGING | Facility: CLINIC | Age: 26
Discharge: HOME | End: 2021-04-20
Payer: COMMERCIAL

## 2021-04-20 VITALS — HEIGHT: 65 IN | BODY MASS INDEX: 23.82 KG/M2 | WEIGHT: 143 LBS

## 2021-04-20 DIAGNOSIS — D24.2 FIBROADENOMA OF LEFT BREAST: ICD-10-CM

## 2021-04-20 PROCEDURE — 76642 ULTRASOUND BREAST LIMITED: CPT

## 2021-06-22 ENCOUNTER — OFFICE VISIT (OUTPATIENT)
Dept: URGENT CARE | Age: 26
End: 2021-06-22
Payer: COMMERCIAL

## 2021-06-22 VITALS — RESPIRATION RATE: 18 BRPM | OXYGEN SATURATION: 97 % | HEART RATE: 97 BPM | TEMPERATURE: 96.5 F

## 2021-06-22 DIAGNOSIS — J02.0 STREP PHARYNGITIS: Primary | ICD-10-CM

## 2021-06-22 LAB — S PYO AG THROAT QL: POSITIVE

## 2021-06-22 PROCEDURE — 87880 STREP A ASSAY W/OPTIC: CPT | Performed by: NURSE PRACTITIONER

## 2021-06-22 PROCEDURE — 99214 OFFICE O/P EST MOD 30 MIN: CPT | Performed by: NURSE PRACTITIONER

## 2021-06-22 RX ORDER — PENICILLIN V POTASSIUM 500 MG/1
500 TABLET ORAL 2 TIMES DAILY
Qty: 20 TABLET | Refills: 0 | Status: SHIPPED | OUTPATIENT
Start: 2021-06-22 | End: 2021-07-02

## 2021-06-22 NOTE — PATIENT INSTRUCTIONS
Your rapid strep came back positive  You have been started on penicillin - it is important to take the entire prescription even if you are feeling better before it is finished  Take motrin and tylenol for pain and fevers  Warm salt water gargles can help with discomfort  Follow up with your PCP  Go to ED for worsening symptoms  Strep Throat   WHAT YOU NEED TO KNOW:   Strep throat is a throat infection caused by bacteria  It is easily spread from person to person  DISCHARGE INSTRUCTIONS:   Call 911 for any of the following:   · You have trouble breathing  Return to the emergency department if:   · You have new symptoms like a bad headache, stiff neck, chest pain, or vomiting  · You are drooling because you cannot swallow your spit  Contact your healthcare provider if:   · You have a fever  · You have a rash or ear pain  · You have green, yellow-brown, or bloody mucus when you cough or blow your nose  · You are unable to drink anything  · You have questions or concerns about your condition or care  Medicines:   · Antibiotics  help treat your strep throat  You should feel better within 2 to 3 days after you start antibiotics  · Take your medicine as directed  Contact your healthcare provider if you think your medicine is not helping or if you have side effects  Tell him or her if you are allergic to any medicine  Keep a list of the medicines, vitamins, and herbs you take  Include the amounts, and when and why you take them  Bring the list or the pill bottles to follow-up visits  Carry your medicine list with you in case of an emergency  Manage your symptoms:   · Use lozenges, ice, soft foods, or popsicles  to soothe your throat  · Drink juice, milk shakes, or soup  if your throat is too sore to eat solid food  Drinking liquids can also help prevent dehydration  · Gargle with salt water  Mix ¼ teaspoon salt in a glass of warm water and gargle   This may help reduce swelling in your throat  · Do not smoke  Nicotine and other chemicals in cigarettes and cigars can cause lung damage and make your symptoms worse  Ask your healthcare provider for information if you currently smoke and need help to quit  E-cigarettes or smokeless tobacco still contain nicotine  Talk to your healthcare provider before you use these products  Return to work or school  24 hours after you start antibiotic medicine  Prevent the spread of strep throat:   · Wash your hands often  Use soap and water  Wash your hands after you use the bathroom, change a child's diapers, or sneeze  Wash your hands before you prepare or eat food  · Do not share food or drinks  Replace your toothbrush after you have taken antibiotics for 24 hours  Follow up with your healthcare provider as directed:  Write down your questions so you remember to ask them during your visits  © Copyright [address] Information is for End User's use only and may not be sold, redistributed or otherwise used for commercial purposes  All illustrations and images included in CareNotes® are the copyrighted property of A D A M , Inc  or [facility] Suzie Pereira   The above information is an  only  It is not intended as medical advice for individual conditions or treatments  Talk to your doctor, nurse or pharmacist before following any medical regimen to see if it is safe and effective for you

## 2021-06-22 NOTE — PROGRESS NOTES
[facility]        NAME: Caroline Garcia is a 32 y o  female  : 1995    MRN: [id number]  DATE: 2021  TIME: 9:11 AM    Assessment and Plan   Strep pharyngitis [J02 0]  1  Strep pharyngitis  POCT rapid strepA    penicillin V potassium (VEETID) 500 mg tablet         Patient Instructions     Your rapid strep came back positive  You have been started on penicillin - it is important to take the entire prescription even if you are feeling better before it is finished  Take motrin and tylenol for pain and fevers  Warm salt water gargles can help with discomfort  Follow up with your PCP  Go to ED for worsening symptoms  Chief Complaint     Chief Complaint   Patient presents with    Sore Throat    Cough         History of Present Illness       Patient presents with sore throat that started yesterday  She reports rhinorrhea, congestion, and post-nasal drip also  She denies fevers  She reports history of seasonal allergies, currently taking Claritin, Flonase, and singular  She has not taken anything for pain relief  She reports history of strep in the past without exudate and without fevers  LMP IUD  She states penicillin generally works well for her         Review of Systems   Review of Systems   Constitutional: Negative  HENT: Positive for congestion, postnasal drip, rhinorrhea and sore throat  Eyes: Negative  Respiratory: Negative  Cardiovascular: Negative  Gastrointestinal: Negative  Endocrine: Negative  Genitourinary: Negative  Musculoskeletal: Negative  Skin: Negative  Allergic/Immunologic: Negative  Neurological: Negative  Hematological: Negative  Psychiatric/Behavioral: Negative  All other systems reviewed and are negative          Current Medications       Current Outpatient Medications:     calcium carbonate (TUMS) 500 mg chewable tablet, Chew 1 tablet as needed for indigestion or heartburn , Disp: , Rfl:     escitalopram (LEXAPRO) 10 mg tablet, TAKE 1 TABLET BY MOUTH EVERY DAY, Disp: 90 tablet, Rfl: 1    fluticasone (FLONASE) 50 mcg/act nasal spray, 2 sprays into each nostril daily, Disp: , Rfl:     levonorgestrel (KYLEENA) 19 5 MG intrauterine device, 1 Intra Uterine Device by Intrauterine route once, Disp: , Rfl:     Loratadine (CLARITIN PO), Take by mouth, Disp: , Rfl:     montelukast (SINGULAIR) 10 mg tablet, TAKE 1 TABLET BY MOUTH EVERY DAY, Disp: 90 tablet, Rfl: 1    OMEPRAZOLE PO, Take by mouth as needed , Disp: , Rfl:     ALPRAZolam (XANAX) 0 25 mg tablet, Take 1 tablet (0 25 mg total) by mouth 3 (three) times a day as needed for anxiety (Patient taking differently: Take 0 25 mg by mouth 3 (three) times a day as needed for anxiety ), Disp: 40 tablet, Rfl: 1    methylPREDNISolone 4 MG tablet therapy pack, Use as directed on package (Patient not taking: Reported on 1/13/2021), Disp: 21 each, Rfl: 0    penicillin V potassium (VEETID) 500 mg tablet, Take 1 tablet (500 mg total) by mouth 2 (two) times a day for 10 days, Disp: 20 tablet, Rfl: 0    Current Allergies     Allergies as of 06/22/2021    (No Known Allergies)            The following portions of the patient's history were reviewed and updated as appropriate: allergies, current medications, past family history, past medical history, past social history, past surgical history and problem list      Past Medical History:   Diagnosis Date    GERD (gastroesophageal reflux disease)     Seasonal allergies        Past Surgical History:   Procedure Laterality Date    UPPER GASTROINTESTINAL ENDOSCOPY      WISDOM TOOTH EXTRACTION         Family History   Problem Relation Age of Onset    No Known Problems Mother     No Known Problems Father     No Known Problems Sister     No Known Problems Brother     Lung cancer Maternal Grandmother         66's    No Known Problems Maternal Grandfather     Lung cancer Paternal Grandmother         ?76    No Known Problems Paternal Grandfather     Breast cancer Paternal Aunt 55    No Known Problems Maternal Aunt          Medications have been verified  Objective   Pulse 97   Temp (!) 96 5 °F (35 8 °C)   Resp 18   LMP 06/22/2021   SpO2 97%   Patient's last menstrual period was 06/22/2021  Physical Exam     Physical Exam  Vitals and nursing note reviewed  Constitutional:       General: She is not in acute distress  Appearance: She is well-developed and normal weight  She is not ill-appearing, toxic-appearing or diaphoretic  HENT:      Head: Normocephalic and atraumatic  Right Ear: Tympanic membrane and ear canal normal       Left Ear: Tympanic membrane and ear canal normal       Nose: Rhinorrhea present  No congestion  Mouth/Throat:      Lips: Pink  No lesions  Mouth: Mucous membranes are moist  No oral lesions  Pharynx: Oropharynx is clear  Uvula midline  Posterior oropharyngeal erythema present  No pharyngeal swelling, oropharyngeal exudate or uvula swelling  Tonsils: No tonsillar exudate or tonsillar abscesses  Eyes:      Extraocular Movements:      Right eye: Normal extraocular motion  Left eye: Normal extraocular motion  Conjunctiva/sclera: Conjunctivae normal       Pupils: Pupils are equal, round, and reactive to light  Cardiovascular:      Rate and Rhythm: Normal rate and regular rhythm  Heart sounds: Normal heart sounds  No murmur heard  No friction rub  No gallop  Pulmonary:      Effort: Pulmonary effort is normal       Breath sounds: Normal breath sounds  Musculoskeletal:      Cervical back: Normal range of motion  Skin:     General: Skin is warm and dry  Capillary Refill: Capillary refill takes less than 2 seconds  Neurological:      General: No focal deficit present  Mental Status: She is alert and oriented to person, place, and time     Psychiatric:         Mood and Affect: Mood normal          Behavior: Behavior normal        POCT rapid strep - positive

## 2021-08-10 ENCOUNTER — OFFICE VISIT (OUTPATIENT)
Dept: FAMILY MEDICINE CLINIC | Facility: CLINIC | Age: 26
End: 2021-08-10
Payer: COMMERCIAL

## 2021-08-10 ENCOUNTER — NURSE TRIAGE (OUTPATIENT)
Dept: OTHER | Facility: OTHER | Age: 26
End: 2021-08-10

## 2021-08-10 VITALS
OXYGEN SATURATION: 99 % | DIASTOLIC BLOOD PRESSURE: 78 MMHG | HEIGHT: 65 IN | WEIGHT: 159 LBS | BODY MASS INDEX: 26.49 KG/M2 | SYSTOLIC BLOOD PRESSURE: 118 MMHG | TEMPERATURE: 98.7 F | HEART RATE: 87 BPM

## 2021-08-10 DIAGNOSIS — R05.9 COUGH: Primary | ICD-10-CM

## 2021-08-10 PROCEDURE — 99213 OFFICE O/P EST LOW 20 MIN: CPT | Performed by: NURSE PRACTITIONER

## 2021-08-10 RX ORDER — ALBUTEROL SULFATE 90 UG/1
2 AEROSOL, METERED RESPIRATORY (INHALATION) EVERY 6 HOURS PRN
Qty: 18 G | Refills: 5 | Status: SHIPPED | OUTPATIENT
Start: 2021-08-10

## 2021-08-10 NOTE — TELEPHONE ENCOUNTER
Patient states that she was told when she was younger that she had a slight case of Asthma  She feels like it is starting to flare up again  She had a "bad" night last evening  Appointment given with Mirna Goodwin at [phone] today

## 2021-08-10 NOTE — TELEPHONE ENCOUNTER
Regarding: Appointment Request  ----- Message from Krystin Saeed sent at 8/10/2021  8:04 AM EDT -----  "I would to be seen today, I am not sure if it's my asthma, however I would like an MD to check me out "

## 2021-08-10 NOTE — TELEPHONE ENCOUNTER
Reason for Disposition   [1] MILD asthma attack (e g , no SOB at rest, mild SOB with walking, speaks normally in sentences, mild wheezing) AND [2]  persists > 24 hours on appropriate treatment    Answer Assessment - Initial Assessment Questions  1  RESPIRATORY STATUS: "Describe your breathing?" (e g , wheezing, shortness of breath, unable to speak, severe coughing)        A little bit SOB now because of coughing last night  Last night she felt that she wheezing and slightly when she woke  2  ONSET: "When did this asthma attack begin?"       Last night worse-but going on for one year on and off at night time  3  TRIGGER: "What do you think triggered this attack?" (e g , URI, exposure to pollen or other allergen, tobacco smoke)       Allergens? Just got 2 kittens 3 weeks ago  4  PEAK EXPIRATORY FLOW RATE (PEFR): "Do you use a peak flow meter?" If Yes, ask: "What's the current peak flow? What's your personal best peak flow?"       Does not have a peak flow  5  SEVERITY: "How bad is this attack?"     - MILD: No SOB at rest, mild SOB with walking, speaks normally in sentences, can lay down, no retractions, pulse < 100  (GREEN Zone: PEFR %)    - MODERATE: SOB at rest, SOB with minimal exertion and prefers to sit, cannot lie down flat, speaks in phrases, mild retractions, audible wheezing, pulse 100-120  (YELLOW Zone: PEFR 50-80%)     - SEVERE: Very SOB at rest, speaks in single words, struggling to breathe, sitting hunched forward, retractions, usually loud wheezing, sometimes minimal wheezing because of decreased air movement, pulse > 120  (RED Zone: PEFR < 50%)  Slight SOB this morning after difficult night last evening  Especially with exertion -SOB this morning    6  MEDICATIONS (Inhaler or nebs): "What are your asthma medications?" and "What treatments have you given so far?"     - Quick-relief: albuterol, metaproterenol, salbutamol, or other inhaled or nebulized beta-agonist medicines    - Long-term-control: steroids, cromolyn, or other anti-inflammatory medicines  No rescue inhalers  On Singulair, Claritin, Flonase daily  7  OTHER SYMPTOMS: "Do you have any other symptoms? (e g , runny nose, chest pain, fever)      Over night had nasal congestion but not now  Ribs are a little sore from coughing through the night  Not coughing now    8  PREGNANCY: "Is there any chance you are pregnant?" "When was your last menstrual period?"      No    Protocols used: ASTHMA ATTACK-ADULT-AH

## 2021-08-10 NOTE — PROGRESS NOTES
Assessment/Plan:     Diagnoses and all orders for this visit:    Cough  -     albuterol (Ventolin HFA) 90 mcg/act inhaler; Inhale 2 puffs every 6 (six) hours as needed for wheezing        Discussed with patient plan to treat with albuterol inhaler as needed  Patient instructed to call if no improvement in 72 hours or symptoms worsen    Subjective:      Patient ID: Letty Majano is a 32 y o  female     32 y  o female presenting with night time cough for the past year  She reports that when she was a child she was diagnosed with mild asthma  She has history of allergies and uses loratadine, Flonase and Singulair on regular basis  She does not seem to have the bothersome cough during the daytime  Family History   Problem Relation Age of Onset    No Known Problems Mother     No Known Problems Father     No Known Problems Sister     No Known Problems Brother     Lung cancer Maternal Grandmother         66's    No Known Problems Maternal Grandfather     Lung cancer Paternal Grandmother         ?76    No Known Problems Paternal Grandfather     Breast cancer Paternal Aunt 55    No Known Problems Maternal Aunt      Social History     Socioeconomic History    Marital status: /Civil Union     Spouse name: Not on file    Number of children: Not on file    Years of education: Not on file    Highest education level: Not on file   Occupational History    Not on file   Tobacco Use    Smoking status: Never Smoker    Smokeless tobacco: Never Used   Vaping Use    Vaping Use: Never used   Substance and Sexual Activity    Alcohol use: Yes     Comment: social    Drug use: Never    Sexual activity: Yes     Partners: Male     Birth control/protection: I U D     Other Topics Concern    Not on file   Social History Narrative    Not on file     Social Determinants of Health     Financial Resource Strain:     Difficulty of Paying Living Expenses:    Food Insecurity:     Worried About Running Out of Food in the Last Year:    [address] in the Last Year:    Transportation Needs:     Lack of Transportation (Medical):  Lack of Transportation (Non-Medical):    Physical Activity:     Days of Exercise per Week:     Minutes of Exercise per Session:    Stress:     Feeling of Stress :    Social Connections:     Frequency of Communication with Friends and Family:     Frequency of Social Gatherings with Friends and Family:     Attends Anabaptist Services:     Active Member of Clubs or Organizations:     Attends Club or Organization Meetings:     Marital Status:    Intimate Partner Violence:     Fear of Current or Ex-Partner:     Emotionally Abused:     Physically Abused:     Sexually Abused:      E-Cigarette/Vaping    E-Cigarette Use Never User      E-Cigarette/Vaping Substances    Nicotine No     THC No     CBD No     Flavoring No     Other No     Unknown No      Past Medical History:   Diagnosis Date    GERD (gastroesophageal reflux disease)     Seasonal allergies      Past Surgical History:   Procedure Laterality Date    UPPER GASTROINTESTINAL ENDOSCOPY      WISDOM TOOTH EXTRACTION       No Known Allergies    Current Outpatient Medications:     calcium carbonate (TUMS) 500 mg chewable tablet, Chew 1 tablet as needed for indigestion or heartburn , Disp: , Rfl:     escitalopram (LEXAPRO) 10 mg tablet, TAKE 1 TABLET BY MOUTH EVERY DAY, Disp: 90 tablet, Rfl: 1    fluticasone (FLONASE) 50 mcg/act nasal spray, 2 sprays into each nostril daily, Disp: , Rfl:     levonorgestrel (KYLEENA) 19 5 MG intrauterine device, 1 Intra Uterine Device by Intrauterine route once, Disp: , Rfl:     Loratadine (CLARITIN PO), Take by mouth, Disp: , Rfl:     montelukast (SINGULAIR) 10 mg tablet, TAKE 1 TABLET BY MOUTH EVERY DAY, Disp: 90 tablet, Rfl: 1    OMEPRAZOLE PO, Take by mouth as needed , Disp: , Rfl: ;[p    Review of Systems   Constitutional: Negative  HENT: Negative      Respiratory: Positive for cough, chest tightness and wheezing  Negative for shortness of breath  Cardiovascular: Negative  Gastrointestinal: Negative  Musculoskeletal: Negative  Skin: Negative  Neurological: Negative  Hematological: Negative for adenopathy  Psychiatric/Behavioral: Negative  Objective:    /78   Pulse 87   Temp 98 7 °F (37 1 °C)   Ht 5' 5" (1 651 m)   Wt 72 1 kg (159 lb)   LMP  (LMP Unknown)   SpO2 99%   BMI 26 46 kg/m² (Reviewed)     Physical Exam  Vitals reviewed  Constitutional:       General: She is not in acute distress  Appearance: She is well-developed and well-groomed  She is not ill-appearing  HENT:      Head: Normocephalic and atraumatic  Eyes:      General: Lids are normal       Extraocular Movements: Extraocular movements intact  Conjunctiva/sclera: Conjunctivae normal       Pupils: Pupils are equal, round, and reactive to light  Neck:      Trachea: Trachea and phonation normal    Cardiovascular:      Rate and Rhythm: Normal rate and regular rhythm  Pulses: Normal pulses  Heart sounds: Normal heart sounds  Pulmonary:      Effort: Pulmonary effort is normal       Breath sounds: Normal breath sounds  Skin:     General: Skin is warm and dry  Capillary Refill: Capillary refill takes less than 2 seconds  Neurological:      General: No focal deficit present  Mental Status: She is alert and oriented to person, place, and time  Psychiatric:         Mood and Affect: Mood normal          Behavior: Behavior normal  Behavior is cooperative  Thought Content: Thought content normal              BMI Counseling: Body mass index is 26 46 kg/m²  The BMI is above normal  Nutrition recommendations include decreasing portion sizes, encouraging healthy choices of fruits and vegetables, consuming healthier snacks, moderation in carbohydrate intake and increasing intake of lean protein   Exercise recommendations include exercising 3-5 times per week and strength training exercises

## 2022-03-14 DIAGNOSIS — F41.9 ANXIETY: ICD-10-CM

## 2022-03-14 DIAGNOSIS — J30.9 ALLERGIC RHINITIS, UNSPECIFIED SEASONALITY, UNSPECIFIED TRIGGER: ICD-10-CM

## 2022-03-15 RX ORDER — MONTELUKAST SODIUM 10 MG/1
TABLET ORAL
Qty: 90 TABLET | Refills: 1 | Status: SHIPPED | OUTPATIENT
Start: 2022-03-15

## 2022-03-15 RX ORDER — ESCITALOPRAM OXALATE 10 MG/1
TABLET ORAL
Qty: 90 TABLET | Refills: 1 | Status: SHIPPED | OUTPATIENT
Start: 2022-03-15

## 2022-03-22 ENCOUNTER — OFFICE VISIT (OUTPATIENT)
Dept: FAMILY MEDICINE CLINIC | Facility: CLINIC | Age: 27
End: 2022-03-22
Payer: COMMERCIAL

## 2022-03-22 VITALS
WEIGHT: 169 LBS | TEMPERATURE: 98.5 F | SYSTOLIC BLOOD PRESSURE: 110 MMHG | HEIGHT: 65 IN | OXYGEN SATURATION: 99 % | HEART RATE: 94 BPM | DIASTOLIC BLOOD PRESSURE: 66 MMHG | BODY MASS INDEX: 28.16 KG/M2

## 2022-03-22 DIAGNOSIS — L30.9 ECZEMA, UNSPECIFIED TYPE: Primary | ICD-10-CM

## 2022-03-22 PROCEDURE — 99213 OFFICE O/P EST LOW 20 MIN: CPT | Performed by: FAMILY MEDICINE

## 2022-03-22 PROCEDURE — 1036F TOBACCO NON-USER: CPT | Performed by: FAMILY MEDICINE

## 2022-03-22 PROCEDURE — 3008F BODY MASS INDEX DOCD: CPT | Performed by: FAMILY MEDICINE

## 2022-03-22 RX ORDER — TRIAMCINOLONE ACETONIDE 5 MG/G
CREAM TOPICAL 3 TIMES DAILY
Qty: 30 G | Refills: 0 | Status: SHIPPED | OUTPATIENT
Start: 2022-03-22

## 2022-03-31 NOTE — PROGRESS NOTES
Patient ID: Saw Parekh is a 32 y o  female  HPI: 32 y  o female presents for evaluaiton of swollen , cracked skin around nailbeds of fingers and tips of fingers  SUBJECTIVE    Family History   Problem Relation Age of Onset    No Known Problems Mother     No Known Problems Father     No Known Problems Sister     No Known Problems Brother     Lung cancer Maternal Grandmother         66's    No Known Problems Maternal Grandfather     Lung cancer Paternal Grandmother         ?76    No Known Problems Paternal Grandfather     Breast cancer Paternal Aunt 55    No Known Problems Maternal Aunt      Social History     Socioeconomic History    Marital status: /Civil Union     Spouse name: Not on file    Number of children: Not on file    Years of education: Not on file    Highest education level: Not on file   Occupational History    Not on file   Tobacco Use    Smoking status: Never Smoker    Smokeless tobacco: Never Used   Vaping Use    Vaping Use: Never used   Substance and Sexual Activity    Alcohol use: Yes     Comment: social    Drug use: Never    Sexual activity: Yes     Partners: Male     Birth control/protection: I U D     Other Topics Concern    Not on file   Social History Narrative    Not on file     Social Determinants of Health     Financial Resource Strain: Not on file   Food Insecurity: Not on file   Transportation Needs: Not on file   Physical Activity: Not on file   Stress: Not on file   Social Connections: Not on file   Intimate Partner Violence: Not on file   Housing Stability: Not on file     Past Medical History:   Diagnosis Date    GERD (gastroesophageal reflux disease)     Seasonal allergies      Past Surgical History:   Procedure Laterality Date    UPPER GASTROINTESTINAL ENDOSCOPY      WISDOM TOOTH EXTRACTION       No Known Allergies    Current Outpatient Medications:     albuterol (Ventolin HFA) 90 mcg/act inhaler, Inhale 2 puffs every 6 (six) hours as needed for wheezing, Disp: 18 g, Rfl: 5    calcium carbonate (TUMS) 500 mg chewable tablet, Chew 1 tablet as needed for indigestion or heartburn , Disp: , Rfl:     escitalopram (LEXAPRO) 10 mg tablet, take 1 tablet by mouth once daily, Disp: 90 tablet, Rfl: 1    fluticasone (FLONASE) 50 mcg/act nasal spray, 2 sprays into each nostril daily, Disp: , Rfl:     levonorgestrel (KYLEENA) 19 5 MG intrauterine device, 1 Intra Uterine Device by Intrauterine route once, Disp: , Rfl:     Loratadine (CLARITIN PO), Take by mouth, Disp: , Rfl:     montelukast (SINGULAIR) 10 mg tablet, take 1 tablet by mouth once daily, Disp: 90 tablet, Rfl: 1    OMEPRAZOLE PO, Take by mouth as needed , Disp: , Rfl:     triamcinolone (KENALOG) 0 5 % cream, Apply topically 3 (three) times a day, Disp: 30 g, Rfl: 0    Review of Systems  Constitutional:     Denies fever, chills ,fatigue ,weakness ,weight loss, weight gain     ENT: Denies earache ,loss of hearing ,nosebleed, nasal discharge,nasal congestion ,sore throat ,hoarseness  Pulmonary: Denies shortness of breath ,cough  ,dyspnea on exertion, orthopnea  ,PND   Cardiovascular:  Denies bradycardia , tachycardia  ,palpations, lower extremity edema leg, claudication  Breast:  Denies new or changing breast lumps ,nipple discharge ,nipple changes  Abdomen:  Denies abdominal pain , anorexia , indigestion, nausea, vomiting, constipation, diarrhea  Musculoskeletal: Denies myalgias, arthralgias, joint swelling, joint stiffness , limb pain, limb swelling  Gu: denies dysuria  Skin: Denies skin rash, skin lesion, skin wound, itching,+ dry skin,fissures at tips of fingers and scaling skin   Neuro: Denies headache, numbness, tingling, confusion, loss of consciousness, dizziness, vertigo  Psychiatric: Denies feelings of depression, suicidal ideation, anxiety, sleep disturbances    OBJECTIVE  /66   Pulse 94   Temp 98 5 °F (36 9 °C)   Ht 5' 5" (1 651 m)   Wt 76 7 kg (169 lb)   SpO2 99%   BMI 28 12 kg/m²   Constitutional:   NAD, well appearing and well nourished      ENT:   Conjunctiva and lids: no injection, edema, or discharge     Pupils and iris: MISA bilaterally    External inspection of ears and nose: normal without deformities or discharge  Otoscopic exam: Canals patent without erythema  Nasal mucosa, septum and turbinates: Normal or edema or discharge         Oropharynx:  Moist mucosa, normal tongue and tonsils without lesions  No erythema        Pulmonary:Respiratory effort normal rate and rhythm, no increased work of breathing  Auscultation of lungs:  Clear bilaterally with no adventitious breath sounds       Cardiovascular: regular rate and rhythm, S1 and S2, no murmur, no edema and/or varicosities of LE      Abdomen: Soft and non-distended     Positive bowel sounds      No heptomegaly or splenomegaly      Gu: no suprapubic tenderness or CVA tenderness, no urethral discharge  Lymphatic:  No anterior or posterior cervical lymphadenopathy         Musculoskeletal:  Gait and station: Normal gait      Digits and nails normal without clubbing or cyanosis       Inspection/palpation of joints, bones, and muscles:  No joint tenderness, swelling, full active and passive range of motion       Skin: Normal skin turgor and no rashes + scaling skin around fingernail beds, fissures of fingertips      Neuro:      Normal reflexes       Psych:   alert and oriented to person, place and time     normal mood and affect       Assessment/Plan:Diagnoses and all orders for this visit:    Eczema, unspecified type  -     triamcinolone (KENALOG) 0 5 % cream; Apply topically 3 (three) times a day        Reviewed with patient plan to treat with above plan      Patient instructed to call in 72 hours if not feeling better or if symptoms worsen

## 2022-04-02 ENCOUNTER — AMB VIDEO VISIT (OUTPATIENT)
Dept: OTHER | Facility: HOSPITAL | Age: 27
End: 2022-04-02
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
## 2022-04-02 PROCEDURE — ECARE PR SL URGENT CARE VIRTUAL VISIT: Performed by: PHYSICIAN ASSISTANT

## 2022-04-02 RX ORDER — AMOXICILLIN AND CLAVULANATE POTASSIUM 875; 125 MG/1; MG/1
1 TABLET, FILM COATED ORAL 2 TIMES DAILY
Qty: 20 TABLET | Refills: 0 | Status: SHIPPED | OUTPATIENT
Start: 2022-04-02 | End: 2022-04-12

## 2022-04-02 NOTE — PATIENT INSTRUCTIONS
Animal Bite   WHAT YOU NEED TO KNOW:   Animal bite injuries range from shallow cuts to deep, life-threatening wounds  An animal can cut or puncture the skin when it bites  Your skin may be torn from your body  Your skin may swell or bruise even if the bite does not break the skin  Animal bites occur more often on the hands, arms, legs, and face  Bites from dogs and cats are the most common injuries  DISCHARGE INSTRUCTIONS:   Return to the emergency department if:   · You have a fever  · Your wound is red, swollen, and draining pus  · You see red streaks on the skin around the wound  · You can no longer move the bitten area  · Your heartbeat and breathing are much faster than usual     · You feel dizzy and confused  Contact your healthcare provider if:   · Your pain does not get better, even after you take pain medicine  · You have nightmares or flashbacks about the animal bite  · You have questions or concerns about your condition or care  Medicines: You may need any of the following:  · Antibiotics  prevent or treat a bacterial infection  · Prescription pain medicine  may be given  Ask how to take this medicine safely  · A tetanus vaccine  may be needed to prevent tetanus  Tetanus is a life-threatening bacterial infection that affects the nerves and muscles  The bacteria can be spread through animal bites  · A rabies vaccine  may be needed to prevent rabies  Rabies is a life-threatening viral infection  The virus can be spread through animal bites  · Take your medicine as directed  Contact your healthcare provider if you think your medicine is not helping or if you have side effects  Tell him of her if you are allergic to any medicine  Keep a list of the medicines, vitamins, and herbs you take  Include the amounts, and when and why you take them  Bring the list or the pill bottles to follow-up visits  Carry your medicine list with you in case of an emergency      Follow up with your healthcare provider in 1 to 2 days: You may need to return to have your stitches removed  Write down your questions so you remember to ask them during your visits  Self-care:   · Apply antibiotic ointment as directed  This helps prevent infection in minor skin wounds  It is available without a doctor's order  · Keep the wound clean and covered  Wash the wound every day with soap and water or germ-killing cleanser  Ask your healthcare provider about the kinds of bandages to use  · Apply ice on your wound  Ice helps decrease swelling and pain  Ice may also help prevent tissue damage  Use an ice pack, or put crushed ice in a plastic bag  Cover it with a towel and place it on your wound for 15 to 20 minutes every hour or as directed  · Elevate the wound area  Raise your wound above the level of your heart as often as you can  This will help decrease swelling and pain  Prop your wound on pillows or blankets to keep it elevated comfortably  Prevent another animal bite:   · Learn to recognize the signs of a scared or angry pet  Avoid quick, sudden movements  · Do not step between animals that are fighting  · Do not leave a pet alone with a young child  · Do not disturb an animal while it eats, sleeps, or cares for its young  · Do not approach an animal you do not know, especially one that is tied up or caged  · Stay away from animals that seem sick or act strangely  · Do not feed or capture wild animals  © Copyright [employer] 2022 Information is for End User's use only and may not be sold, redistributed or otherwise used for commercial purposes  All illustrations and images included in CareNotes® are the copyrighted property of [employer]  or [facility] Suzie Pereira   The above information is an  only  It is not intended as medical advice for individual conditions or treatments   Talk to your doctor, nurse or pharmacist before following any medical regimen to see if it is safe and effective for you

## 2022-04-02 NOTE — PROGRESS NOTES
Video Visit - Deepika Juan 32 y o  female MRN: [id number]    REQUIRED DOCUMENTATION:         1  This service was provided via [employer]  2  Provider located at [address] [place] [id number]  3  [facility] provider: Mandi Pittman PA-C   4  Identify all parties in room with patient during [facility] visit:  parent(s)-permission granted or assumed due to patient age  11  After connecting through [email or web address], patient was identified by name and date of birth  Patient was then informed that this was a Telemedicine visit and that the exam was being conducted confidentially over secure lines  My office door was closed  No one else was in the room  Patient acknowledged consent and understanding of privacy and security of the Telemedicine visit  I informed the patient that I have reviewed their record in Epic and presented the opportunity for them to ask any questions regarding the visit today  The patient agreed to participate  VITALS: Heart Rate: 88 BPM, Respiratory Rate: 16 RPM, Temperature Unavailable° F, Blood Pressure Unavailable mmHg, Pulse Ox Unavailable % on RA    HPI  Pt reports some cat scratches and punctures she sustained about 45 minutes ago- cleaned it out, put hand  on it  Her cat was choking and she saved him  He is UTD on his vaccines  Unsure of her last tetanus  Reviewing her chart >10 years  Advised to get tdap updates at [facility] when she picks up her rx  Physical Exam  Constitutional:       General: She is not in acute distress  Appearance: Normal appearance  She is not toxic-appearing  HENT:      Head: Normocephalic and atraumatic  Nose: No rhinorrhea  Mouth/Throat:      Mouth: Mucous membranes are moist    Eyes:      Conjunctiva/sclera: Conjunctivae normal    Pulmonary:      Effort: Pulmonary effort is normal  No respiratory distress  Breath sounds: No wheezing (no gross audible wheeze through computer)     Musculoskeletal: Cervical back: Normal range of motion  Skin:     Comments: Multiple lacerations to BUE and BLE, none actively bleeding  Puncture wound to 1st webbed space hand from cat bite  Neurological:      Mental Status: She is alert  Cranial Nerves: No dysarthria or facial asymmetry  Psychiatric:         Mood and Affect: Mood normal          Behavior: Behavior normal          Diagnoses and all orders for this visit:    Cat scratch  -     amoxicillin-clavulanate (AUGMENTIN) 875-125 mg per tablet; Take 1 tablet by mouth 2 (two) times a day for 10 days    Cat bite, initial encounter  -     amoxicillin-clavulanate (AUGMENTIN) 875-125 mg per tablet; Take 1 tablet by mouth 2 (two) times a day for 10 days      Patient Instructions   Animal Bite   WHAT YOU NEED TO KNOW:   Animal bite injuries range from shallow cuts to deep, life-threatening wounds  An animal can cut or puncture the skin when it bites  Your skin may be torn from your body  Your skin may swell or bruise even if the bite does not break the skin  Animal bites occur more often on the hands, arms, legs, and face  Bites from dogs and cats are the most common injuries  DISCHARGE INSTRUCTIONS:   Return to the emergency department if:   · You have a fever  · Your wound is red, swollen, and draining pus  · You see red streaks on the skin around the wound  · You can no longer move the bitten area  · Your heartbeat and breathing are much faster than usual     · You feel dizzy and confused  Contact your healthcare provider if:   · Your pain does not get better, even after you take pain medicine  · You have nightmares or flashbacks about the animal bite  · You have questions or concerns about your condition or care  Medicines: You may need any of the following:  · Antibiotics  prevent or treat a bacterial infection  · Prescription pain medicine  may be given  Ask how to take this medicine safely      · A tetanus vaccine  may be needed to prevent tetanus  Tetanus is a life-threatening bacterial infection that affects the nerves and muscles  The bacteria can be spread through animal bites  · A rabies vaccine  may be needed to prevent rabies  Rabies is a life-threatening viral infection  The virus can be spread through animal bites  · Take your medicine as directed  Contact your healthcare provider if you think your medicine is not helping or if you have side effects  Tell him of her if you are allergic to any medicine  Keep a list of the medicines, vitamins, and herbs you take  Include the amounts, and when and why you take them  Bring the list or the pill bottles to follow-up visits  Carry your medicine list with you in case of an emergency  Follow up with your healthcare provider in 1 to 2 days: You may need to return to have your stitches removed  Write down your questions so you remember to ask them during your visits  Self-care:   · Apply antibiotic ointment as directed  This helps prevent infection in minor skin wounds  It is available without a doctor's order  · Keep the wound clean and covered  Wash the wound every day with soap and water or germ-killing cleanser  Ask your healthcare provider about the kinds of bandages to use  · Apply ice on your wound  Ice helps decrease swelling and pain  Ice may also help prevent tissue damage  Use an ice pack, or put crushed ice in a plastic bag  Cover it with a towel and place it on your wound for 15 to 20 minutes every hour or as directed  · Elevate the wound area  Raise your wound above the level of your heart as often as you can  This will help decrease swelling and pain  Prop your wound on pillows or blankets to keep it elevated comfortably  Prevent another animal bite:   · Learn to recognize the signs of a scared or angry pet  Avoid quick, sudden movements  · Do not step between animals that are fighting  · Do not leave a pet alone with a young child      · Do not disturb an animal while it eats, sleeps, or cares for its young  · Do not approach an animal you do not know, especially one that is tied up or caged  · Stay away from animals that seem sick or act strangely  · Do not feed or capture wild animals  © Copyright [employer] 2022 Information is for End User's use only and may not be sold, redistributed or otherwise used for commercial purposes  All illustrations and images included in CareNotes® are the copyrighted property of [employer]  or [employer]  The above information is an  only  It is not intended as medical advice for individual conditions or treatments  Talk to your doctor, nurse or pharmacist before following any medical regimen to see if it is safe and effective for you

## 2022-04-02 NOTE — CARE ANYWHERE EVISITS
Visit Summary for [facility] - Gender: Female - Date of Birth: [id number]  Date: [id number] - Duration: 9 minutes  Patient: [facility]  Provider: Lita Lomax PA-C    Patient Contact Information  Address  [address]; [address]  [phone]    Visit Topics    Triage Questions   What is your current physical address in the event of a medical emergency? Answer []  Are you allergic to any medications? Answer []  Are you now or could you be pregnant? Answer []  Do you have any immune system compromise or chronic lung   disease? Answer []  Do you have any vulnerable family members in the home (infant, pregnant, cancer, elderly)? Answer []     Conversation Transcripts  [0A][0A] [Notification] You are connected with Lita Lomax PA-C, Urgent Care Specialist [0A][Notification] [facility] [place]  [0A][Notification] [facility]  [place]  [0A]    Diagnosis  Scratched by cat, initial encounter  Bitten by cat, initial encounter    Procedures  Value: [id number] Code: CPT-4 UNLISTED E&M SERVICE    Medications Prescribed    No prescriptions ordered    Electronically signed by: Nara Bearden(NPI [id number])

## 2022-05-13 ENCOUNTER — TELEPHONE (OUTPATIENT)
Dept: ADMINISTRATIVE | Facility: OTHER | Age: 27
End: 2022-05-13

## 2022-05-13 ENCOUNTER — OFFICE VISIT (OUTPATIENT)
Dept: FAMILY MEDICINE CLINIC | Facility: CLINIC | Age: 27
End: 2022-05-13
Payer: COMMERCIAL

## 2022-05-13 VITALS
OXYGEN SATURATION: 100 % | TEMPERATURE: 97.9 F | WEIGHT: 155 LBS | HEIGHT: 65 IN | DIASTOLIC BLOOD PRESSURE: 68 MMHG | HEART RATE: 92 BPM | RESPIRATION RATE: 14 BRPM | BODY MASS INDEX: 25.83 KG/M2 | SYSTOLIC BLOOD PRESSURE: 122 MMHG

## 2022-05-13 DIAGNOSIS — E01.0 THYROMEGALY: ICD-10-CM

## 2022-05-13 DIAGNOSIS — K21.00 GASTROESOPHAGEAL REFLUX DISEASE WITH ESOPHAGITIS, UNSPECIFIED WHETHER HEMORRHAGE: ICD-10-CM

## 2022-05-13 DIAGNOSIS — Z00.00 ANNUAL PHYSICAL EXAM: Primary | ICD-10-CM

## 2022-05-13 PROCEDURE — 99395 PREV VISIT EST AGE 18-39: CPT | Performed by: FAMILY MEDICINE

## 2022-05-13 PROCEDURE — 3008F BODY MASS INDEX DOCD: CPT | Performed by: FAMILY MEDICINE

## 2022-05-13 PROCEDURE — 1036F TOBACCO NON-USER: CPT | Performed by: FAMILY MEDICINE

## 2022-05-13 PROCEDURE — 99214 OFFICE O/P EST MOD 30 MIN: CPT | Performed by: FAMILY MEDICINE

## 2022-05-13 RX ORDER — PANTOPRAZOLE SODIUM 40 MG/1
40 TABLET, DELAYED RELEASE ORAL
Qty: 30 TABLET | Refills: 5 | Status: SHIPPED | OUTPATIENT
Start: 2022-05-13

## 2022-05-13 NOTE — TELEPHONE ENCOUNTER
Upon review of the In Basket request and the patient's chart, initial outreach has been made via fax, please see Contacts section for details       Thank you  Yris Sequeira [facility], [facility] [phone] Fax [phone]

## 2022-05-13 NOTE — LETTER
Vaccination Request Form: Tdap      Date Requested: 22  Patient: Jess Johnson  Patient : 1995   Referring Provider: Steve Squires DO       The above patient has informed us that they have had their   most recent Tdap administered at your facility  Please   complete this form and attach all corresponding documentation  Date of Vaccine(s) Given  ______________________________    Lot Number(s) _______________________________________    Manufacture(s) ______________________________________    Dose Amount (s) _____________________________________    Expiration Date(s) ____________________________________    Comments __________________________________________________________  ____________________________________________________________________  ____________________________________________________________________  ____________________________________________________________________    Administering Facility  ________________________________________________    Vaccine Administered By (print name) ___________________________________      Form Completed By (print name) _______________________________________      Signature ___________________________________________________________      These reports are needed for  compliance  Please fax this completed form and a copy of the Vaccine Document(s) to our office located at [place] [address] as soon as possible to [phone] kristel Arteaga: Phone [phone]    We thank you for your assistance in treating our mutual patient

## 2022-05-13 NOTE — TELEPHONE ENCOUNTER
----- Message from Gee Herbert sent at 5/13/2022  8:58 AM EDT -----  Regarding: TDAP Immunization  05/13/22 8:59 AM    Hello, our patient Raquel Self has had TDAP Immunization completed/performed  Please assist in updating the patient chart by making an External outreach to [address] [place] [address] located in [address] [place] [address] 3  The date of service is two months ago      Thank you,  Gee Herbert  [facility]  [address]

## 2022-05-16 NOTE — TELEPHONE ENCOUNTER
Upon review of the In Basket request we were able to locate, review, and update the patient chart as requested for Immunization(s) TDAP  Any additional questions or concerns should be emailed to the Practice Liaisons via [email or web address] email, please do not reply via In Basket      Thank you  Archie Taylor, [place]

## 2022-05-18 NOTE — PATIENT INSTRUCTIONS

## 2022-05-18 NOTE — PROGRESS NOTES
Patient ID: Raquel Self is a 32 y o  female  HPI: 32 y  o female presents for evaluation of thyromegaly  US showed some nonspecific small cysts that do not require followup  Pt complaining of weight gain with no change in diet or activity level  She is experiencing  GERD several times a week despite being on omeprazole therapy  She denies any black stools, bloody stools  SUBJECTIVE    Family History   Problem Relation Age of Onset    No Known Problems Mother     No Known Problems Father     No Known Problems Sister     No Known Problems Brother     Lung cancer Maternal Grandmother         66's    No Known Problems Maternal Grandfather     Lung cancer Paternal Grandmother         ?76    No Known Problems Paternal Grandfather     Breast cancer Paternal Aunt 55    No Known Problems Maternal Aunt      Social History     Socioeconomic History    Marital status: /Civil Union     Spouse name: Not on file    Number of children: Not on file    Years of education: Not on file    Highest education level: Not on file   Occupational History    Not on file   Tobacco Use    Smoking status: Never Smoker    Smokeless tobacco: Never Used   Vaping Use    Vaping Use: Never used   Substance and Sexual Activity    Alcohol use: Yes     Alcohol/week: 8 0 standard drinks     Types: 8 Standard drinks or equivalent per week    Drug use: Never    Sexual activity: Yes     Partners: Male     Birth control/protection: I U D     Other Topics Concern    Not on file   Social History Narrative    Not on file     Social Determinants of Health     Financial Resource Strain: Not on file   Food Insecurity: Not on file   Transportation Needs: Not on file   Physical Activity: Not on file   Stress: Not on file   Social Connections: Not on file   Intimate Partner Violence: Not on file   Housing Stability: Not on file     Past Medical History:   Diagnosis Date    GERD (gastroesophageal reflux disease)     Seasonal allergies      Past Surgical History:   Procedure Laterality Date    UPPER GASTROINTESTINAL ENDOSCOPY      WISDOM TOOTH EXTRACTION       No Known Allergies    Current Outpatient Medications:     albuterol (Ventolin HFA) 90 mcg/act inhaler, Inhale 2 puffs every 6 (six) hours as needed for wheezing, Disp: 18 g, Rfl: 5    calcium carbonate (TUMS) 500 mg chewable tablet, Chew 1 tablet as needed for indigestion or heartburn , Disp: , Rfl:     escitalopram (LEXAPRO) 10 mg tablet, take 1 tablet by mouth once daily, Disp: 90 tablet, Rfl: 1    fluticasone (FLONASE) 50 mcg/act nasal spray, 2 sprays into each nostril daily, Disp: , Rfl:     levonorgestrel (KYLEENA) 19 5 MG intrauterine device, 1 Intra Uterine Device by Intrauterine route once, Disp: , Rfl:     Loratadine (CLARITIN PO), Take by mouth in the morning, Disp: , Rfl:     montelukast (SINGULAIR) 10 mg tablet, take 1 tablet by mouth once daily, Disp: 90 tablet, Rfl: 1    OMEPRAZOLE PO, Take by mouth as needed , Disp: , Rfl:     pantoprazole (PROTONIX) 40 mg tablet, Take 1 tablet (40 mg total) by mouth daily before breakfast, Disp: 30 tablet, Rfl: 5    triamcinolone (KENALOG) 0 5 % cream, Apply topically 3 (three) times a day (Patient taking differently: Apply topically daily as needed (eczema on finger)), Disp: 30 g, Rfl: 0    Review of Systems  Constitutional:     Denies fever, chills ,fatigue ,weakness ,weight loss,+ weight gain     ENT: Denies earache ,loss of hearing ,nosebleed, nasal discharge,nasal congestion ,sore throat ,hoarseness  Pulmonary: Denies shortness of breath ,cough  ,dyspnea on exertion, orthopnea  ,PND   Cardiovascular:  Denies bradycardia , tachycardia  ,palpations, lower extremity edema leg, claudication  Breast:  Denies new or changing breast lumps ,nipple discharge ,nipple changes  Abdomen:  Denies abdominal pain , anorexia , +indigestion, denies any nausea, vomiting, constipation, or diarrhea  Musculoskeletal: Denies myalgias, arthralgias, joint swelling, joint stiffness , limb pain, limb swelling  Gu: denies dysuria, polyuria  Skin: Denies skin rash, skin lesion, skin wound, itching, dry skin  Neuro: Denies headache, numbness, tingling, confusion, loss of consciousness, dizziness, vertigo  Psychiatric: Denies feelings of depression, suicidal ideation, anxiety, sleep disturbances    OBJECTIVE  /68   Pulse 92   Temp 97 9 °F (36 6 °C)   Resp 14   Ht 5' 5" (1 651 m)   Wt 70 3 kg (155 lb)   SpO2 100%   Breastfeeding No   BMI 25 79 kg/m²   Constitutional:   NAD, well appearing and well nourished      ENT:   Conjunctiva and lids: no injection, edema, or discharge     Pupils and iris: MISA bilaterally    External inspection of ears and nose: normal without deformities or discharge  Otoscopic exam: Canals patent without erythema  Nasal mucosa, septum and turbinates: Normal or edema or discharge         Oropharynx:  Moist mucosa, normal tongue and tonsils without lesions  No erythema        Pulmonary:Respiratory effort normal rate and rhythm, no increased work of breathing   Auscultation of lungs:  Clear bilaterally with no adventitious breath sounds       Cardiovascular: regular rate and rhythm, S1 and S2, no murmur, no edema and/or varicosities of LE      Abdomen: Soft and non-distended     Positive bowel sounds      No heptomegaly or splenomegaly      Gu: no suprapubic tenderness or CVA tenderness, no urethral discharge  Lymphatic:  No anterior or posterior cervical lymphadenopathy         Musculoskeletal:  Gait and station: Normal gait      Digits and nails normal without clubbing or cyanosis       Inspection/palpation of joints, bones, and muscles:  No joint tenderness, swelling, full active and passive range of motion       Skin: Normal skin turgor and no rashes      Neuro:     Normal reflexes     Psych:   alert and oriented to person, place and time     normal mood and affect Assessment/Plan:Diagnoses and all orders for this visit:    Annual physical exam    Thyromegaly  -     TSH, 3rd generation with Free T4 reflex; Future    Gastroesophageal reflux disease with esophagitis, unspecified whether hemorrhage  -     pantoprazole (PROTONIX) 40 mg tablet; Take 1 tablet (40 mg total) by mouth daily before breakfast        Reviewed with patient plan to treat with above plan      Patient instructed to call in 72 hours if not feeling better or if symptoms worsen

## 2022-05-18 NOTE — PROGRESS NOTES
ADULT ANNUAL [address]    NAME: Amna Schmidt  AGE: 32 y o  SEX: female  : 1995     DATE: 2022     Assessment and Plan:     Problem List Items Addressed This Visit        Digestive    Gastroesophageal reflux disease with esophagitis    Relevant Medications    pantoprazole (PROTONIX) 40 mg tablet      Other Visit Diagnoses     Thyromegaly    -  Primary    Relevant Orders    TSH, 3rd generation with Free T4 reflex          Immunizations and preventive care screenings were discussed with patient today  Appropriate education was printed on patient's after visit summary  Counseling:  · Exercise: the importance of regular exercise/physical activity was discussed  Recommend exercise 3-5 times per week for at least 30 minutes  No follow-ups on file  Chief Complaint:     Chief Complaint   Patient presents with    Annual Exam      History of Present Illness:     Adult Annual Physical   Patient here for a comprehensive physical exam  The patient reports no problems  Diet and Physical Activity  · Diet/Nutrition: well balanced diet  · Exercise: walking  Depression Screening  PHQ-2/9 Depression Screening         General Health  · Sleep: sleeps well  · Hearing: normal - bilateral   · Vision: no vision problems  · Dental: regular dental visits  /GYN Health  · Last menstrual period: April  · Contraceptive method: IUD placement  · History of STDs?: no      Review of Systems:     Review of Systems   Constitutional: Negative for appetite change, chills and fever  HENT: Negative for ear pain, facial swelling, rhinorrhea, sinus pain, sore throat and trouble swallowing  Eyes: Negative for discharge and redness  Respiratory: Negative for chest tightness, shortness of breath and wheezing  Cardiovascular: Negative for chest pain and palpitations     Gastrointestinal: Negative for abdominal pain, diarrhea, nausea and vomiting  Endocrine: Negative for polyuria  Genitourinary: Negative for dysuria and urgency  Musculoskeletal: Negative for arthralgias and back pain  Skin: Negative for rash  Neurological: Negative for dizziness, weakness and headaches  Hematological: Negative for adenopathy  Psychiatric/Behavioral: Negative for behavioral problems, confusion and sleep disturbance  All other systems reviewed and are negative  Past Medical History:     Past Medical History:   Diagnosis Date    GERD (gastroesophageal reflux disease)     Seasonal allergies       Past Surgical History:     Past Surgical History:   Procedure Laterality Date    UPPER GASTROINTESTINAL ENDOSCOPY      WISDOM TOOTH EXTRACTION        Social History:     Social History     Socioeconomic History    Marital status: /Civil Union     Spouse name: None    Number of children: None    Years of education: None    Highest education level: None   Occupational History    None   Tobacco Use    Smoking status: Never Smoker    Smokeless tobacco: Never Used   Vaping Use    Vaping Use: Never used   Substance and Sexual Activity    Alcohol use: Yes     Alcohol/week: 8 0 standard drinks     Types: 8 Standard drinks or equivalent per week    Drug use: Never    Sexual activity: Yes     Partners: Male     Birth control/protection: I U D     Other Topics Concern    None   Social History Narrative    None     Social Determinants of Health     Financial Resource Strain: Not on file   Food Insecurity: Not on file   Transportation Needs: Not on file   Physical Activity: Not on file   Stress: Not on file   Social Connections: Not on file   Intimate Partner Violence: Not on file   Housing Stability: Not on file      Family History:     Family History   Problem Relation Age of Onset    No Known Problems Mother     No Known Problems Father     No Known Problems Sister     No Known Problems Brother     Lung cancer Maternal Grandmother         66's    No Known Problems Maternal Grandfather     Lung cancer Paternal Grandmother         ?76    No Known Problems Paternal Grandfather     Breast cancer Paternal Aunt 55    No Known Problems Maternal Aunt       Current Medications:     Current Outpatient Medications   Medication Sig Dispense Refill    albuterol (Ventolin HFA) 90 mcg/act inhaler Inhale 2 puffs every 6 (six) hours as needed for wheezing 18 g 5    calcium carbonate (TUMS) 500 mg chewable tablet Chew 1 tablet as needed for indigestion or heartburn       escitalopram (LEXAPRO) 10 mg tablet take 1 tablet by mouth once daily 90 tablet 1    fluticasone (FLONASE) 50 mcg/act nasal spray 2 sprays into each nostril daily      levonorgestrel (KYLEENA) 19 5 MG intrauterine device 1 Intra Uterine Device by Intrauterine route once      Loratadine (CLARITIN PO) Take by mouth in the morning      montelukast (SINGULAIR) 10 mg tablet take 1 tablet by mouth once daily 90 tablet 1    OMEPRAZOLE PO Take by mouth as needed       pantoprazole (PROTONIX) 40 mg tablet Take 1 tablet (40 mg total) by mouth daily before breakfast 30 tablet 5    triamcinolone (KENALOG) 0 5 % cream Apply topically 3 (three) times a day (Patient taking differently: Apply topically daily as needed (eczema on finger)) 30 g 0     No current facility-administered medications for this visit  Allergies:     No Known Allergies   Physical Exam:     /68   Pulse 92   Temp 97 9 °F (36 6 °C)   Resp 14   Ht 5' 5" (1 651 m)   Wt 70 3 kg (155 lb)   SpO2 100%   Breastfeeding No   BMI 25 79 kg/m²     Physical Exam  Vitals and nursing note reviewed  Constitutional:       General: She is not in acute distress  Appearance: Normal appearance  She is not ill-appearing or diaphoretic  HENT:      Head: Normocephalic and atraumatic        Right Ear: Tympanic membrane, ear canal and external ear normal       Left Ear: Tympanic membrane, ear canal and external ear normal       Nose: Nose normal  No congestion or rhinorrhea  Mouth/Throat:      Mouth: Mucous membranes are moist       Pharynx: Oropharynx is clear  No posterior oropharyngeal erythema  Eyes:      General:         Right eye: No discharge  Left eye: No discharge  Extraocular Movements: Extraocular movements intact  Conjunctiva/sclera: Conjunctivae normal       Pupils: Pupils are equal, round, and reactive to light  Neck:      Vascular: No carotid bruit  Cardiovascular:      Rate and Rhythm: Normal rate and regular rhythm  Pulses: Normal pulses  Heart sounds: Normal heart sounds  No murmur heard  Pulmonary:      Effort: Pulmonary effort is normal  No respiratory distress  Breath sounds: Normal breath sounds  No wheezing or rhonchi  Abdominal:      General: Abdomen is flat  Bowel sounds are normal  There is no distension  Palpations: There is no mass  Tenderness: There is no abdominal tenderness  Musculoskeletal:         General: No swelling or deformity  Normal range of motion  Cervical back: Normal range of motion and neck supple  No rigidity  Right lower leg: No edema  Left lower leg: No edema  Lymphadenopathy:      Cervical: No cervical adenopathy  Skin:     General: Skin is warm and dry  Capillary Refill: Capillary refill takes less than 2 seconds  Coloration: Skin is not jaundiced  Findings: No bruising, erythema or rash  Neurological:      General: No focal deficit present  Mental Status: She is alert and oriented to person, place, and time  Cranial Nerves: No cranial nerve deficit  Sensory: No sensory deficit  Gait: Gait normal       Deep Tendon Reflexes: Reflexes normal    Psychiatric:         Mood and Affect: Mood normal          Behavior: Behavior normal          Thought Content:  Thought content normal          Judgment: Judgment normal           DO DION [facility]

## 2022-05-26 DIAGNOSIS — K21.00 GASTROESOPHAGEAL REFLUX DISEASE WITH ESOPHAGITIS, UNSPECIFIED WHETHER HEMORRHAGE: Primary | ICD-10-CM

## 2022-12-02 ENCOUNTER — ANNUAL EXAM (OUTPATIENT)
Dept: OBGYN CLINIC | Facility: CLINIC | Age: 27
End: 2022-12-02

## 2022-12-02 VITALS
BODY MASS INDEX: 28.16 KG/M2 | DIASTOLIC BLOOD PRESSURE: 58 MMHG | WEIGHT: 169 LBS | SYSTOLIC BLOOD PRESSURE: 132 MMHG | HEIGHT: 65 IN

## 2022-12-02 DIAGNOSIS — Z12.4 SCREENING FOR MALIGNANT NEOPLASM OF CERVIX: ICD-10-CM

## 2022-12-02 DIAGNOSIS — Z01.419 WELL WOMAN EXAM WITH ROUTINE GYNECOLOGICAL EXAM: Primary | ICD-10-CM

## 2022-12-02 NOTE — PROGRESS NOTES
ASSESSMENT & PLAN:   Diagnoses and all orders for this visit:    Well woman exam with routine gynecological exam  -     Liquid-based pap, screening    Screening for malignant neoplasm of cervix  -     Liquid-based pap, screening          The following were reviewed in today's visit: ASCCP guidelines, Gardisil vaccination, STD testing breast self exam, family planning choices, exercise and healthy diet  Patient to return to office in yearly for annual exam      All questions have been answered to her satisfaction  CC:  Annual Gynecologic Examination  Chief Complaint   Patient presents with   • Gynecologic Exam     Pt here today for her annual exam pap 09/16/2019 wnl  Crooks Cam 09/25/2020       HPI: Young Xavier is a 32 y o  [place] who presents for annual gynecologic examination  She has the following concerns:  none      Health Maintenance:    Exercise: frequently  Breast exams/breast awareness: yes  Diet: well balanced diet      Past Medical History:   Diagnosis Date   • GERD (gastroesophageal reflux disease)    • Seasonal allergies        Past Surgical History:   Procedure Laterality Date   • UPPER GASTROINTESTINAL ENDOSCOPY     • WISDOM TOOTH EXTRACTION         Past OB/Gyn History:  Period Duration (Days): 1-4  Period Pattern: (!) Irregular  Menstrual Flow: Light  Menstrual Control: Tampon, Thin padNo LMP recorded (lmp unknown)  Patient has had an implant  Last Pap: 2019 : no abnormalities  History of abnormal Pap smear: no  HPV vaccine completed: no    Patient is currently sexually active     STD testing: no  Current contraception: IUD      Family History  Family History   Problem Relation Age of Onset   • No Known Problems Mother    • No Known Problems Father    • No Known Problems Sister    • No Known Problems Brother    • Lung cancer Maternal Grandmother         70's   • No Known Problems Maternal Grandfather    • Lung cancer Paternal Grandmother         ?76   • No Known Problems Paternal Grandfather    • Breast cancer Paternal Aunt 55   • No Known Problems Maternal Aunt        Family history of uterine or ovarian cancer: no  Family history of breast cancer: no  Family history of colon cancer: no    Social History:  Social History     Socioeconomic History   • Marital status: /Civil Union     Spouse name: Not on file   • Number of children: Not on file   • Years of education: Not on file   • Highest education level: Not on file   Occupational History   • Not on file   Tobacco Use   • Smoking status: Never   • Smokeless tobacco: Never   Vaping Use   • Vaping Use: Never used   Substance and Sexual Activity   • Alcohol use: Yes     Alcohol/week: 8 0 standard drinks     Types: 8 Standard drinks or equivalent per week   • Drug use: Never   • Sexual activity: Yes     Partners: Male     Birth control/protection: I U D     Other Topics Concern   • Not on file   Social History Narrative   • Not on file     Social Determinants of Health     Financial Resource Strain: Not on file   Food Insecurity: Not on file   Transportation Needs: Not on file   Physical Activity: Not on file   Stress: Not on file   Social Connections: Not on file   Intimate Partner Violence: Not on file   Housing Stability: Not on file     Domestic violence screen: negative    Allergies:  No Known Allergies    Medications:    Current Outpatient Medications:   •  albuterol (Ventolin HFA) 90 mcg/act inhaler, Inhale 2 puffs every 6 (six) hours as needed for wheezing, Disp: 18 g, Rfl: 5  •  calcium carbonate (TUMS) 500 mg chewable tablet, Chew 1 tablet as needed for indigestion or heartburn , Disp: , Rfl:   •  escitalopram (LEXAPRO) 10 mg tablet, take 1 tablet by mouth once daily, Disp: 90 tablet, Rfl: 1  •  fluticasone (FLONASE) 50 mcg/act nasal spray, 2 sprays into each nostril daily, Disp: , Rfl:   •  levonorgestrel (KYLEENA) 19 5 MG intrauterine device, 1 Intra Uterine Device by Intrauterine route once, Disp: , Rfl:   •  Loratadine (CLARITIN PO), Take by mouth in the morning, Disp: , Rfl:   •  montelukast (SINGULAIR) 10 mg tablet, take 1 tablet by mouth once daily, Disp: 90 tablet, Rfl: 1  •  triamcinolone (KENALOG) 0 5 % cream, Apply topically 3 (three) times a day (Patient taking differently: Apply topically daily as needed (eczema on finger)), Disp: 30 g, Rfl: 0  •  pantoprazole (PROTONIX) 40 mg tablet, Take 1 tablet (40 mg total) by mouth daily before breakfast (Patient not taking: Reported on 12/2/2022), Disp: 30 tablet, Rfl: 5    Review of Systems:  Review of Systems   Constitutional: Negative  HENT: Negative  Respiratory: Negative  Cardiovascular: Negative  Gastrointestinal: Negative  Genitourinary: Negative  Musculoskeletal: Negative  Skin: Negative  Neurological: Negative  Psychiatric/Behavioral: Negative  Physical Exam:  /58 (BP Location: Right arm, Patient Position: Sitting, Cuff Size: Standard)   Ht 5' 5" (1 651 m)   Wt 76 7 kg (169 lb)   LMP  (LMP Unknown)   BMI 28 12 kg/m²    Physical Exam  Constitutional:       Appearance: Normal appearance  Genitourinary:      Bladder and urethral meatus normal       No lesions in the vagina  Right Labia: No rash, tenderness, lesions, skin changes or Bartholin's cyst      Left Labia: No tenderness, lesions, skin changes, Bartholin's cyst or rash  No vaginal erythema, tenderness or bleeding  Right Adnexa: not tender, not full and no mass present  Left Adnexa: not tender, not full and no mass present  Cervix is nulliparous  No cervical motion tenderness, discharge, lesion or polyp  IUD strings visualized  Uterus is not enlarged, fixed or tender  No uterine mass detected  Urethral meatus caruncle not present  No urethral tenderness or mass present  Breasts:     Right: No swelling, bleeding, inverted nipple, mass, nipple discharge, skin change or tenderness        Left: No swelling, bleeding, inverted nipple, mass, nipple discharge, skin change or tenderness  HENT:      Head: Normocephalic and atraumatic  Eyes:      Extraocular Movements: Extraocular movements intact  Conjunctiva/sclera: Conjunctivae normal       Pupils: Pupils are equal, round, and reactive to light  Cardiovascular:      Rate and Rhythm: Normal rate and regular rhythm  Heart sounds: Normal heart sounds  No murmur heard  Pulmonary:      Effort: Pulmonary effort is normal  No respiratory distress  Breath sounds: Normal breath sounds  No wheezing or rales  Abdominal:      General: There is no distension  Palpations: Abdomen is soft  Tenderness: There is no abdominal tenderness  There is no guarding  Neurological:      General: No focal deficit present  Mental Status: She is alert and oriented to person, place, and time  Skin:     General: Skin is warm and dry  Psychiatric:         Mood and Affect: Mood normal          Behavior: Behavior normal    Vitals and nursing note reviewed

## 2022-12-09 LAB
LAB AP GYN PRIMARY INTERPRETATION: NORMAL
Lab: NORMAL

## 2023-03-08 DIAGNOSIS — F41.9 ANXIETY: ICD-10-CM

## 2023-03-08 RX ORDER — ESCITALOPRAM OXALATE 10 MG/1
TABLET ORAL
Qty: 90 TABLET | Refills: 1 | Status: SHIPPED | OUTPATIENT
Start: 2023-03-08

## 2023-06-23 ENCOUNTER — OFFICE VISIT (OUTPATIENT)
Dept: FAMILY MEDICINE CLINIC | Facility: CLINIC | Age: 28
End: 2023-06-23
Payer: COMMERCIAL

## 2023-06-23 VITALS
TEMPERATURE: 97.6 F | OXYGEN SATURATION: 99 % | DIASTOLIC BLOOD PRESSURE: 68 MMHG | HEIGHT: 65 IN | HEART RATE: 92 BPM | SYSTOLIC BLOOD PRESSURE: 120 MMHG | WEIGHT: 169.8 LBS | BODY MASS INDEX: 28.29 KG/M2

## 2023-06-23 DIAGNOSIS — K21.9 GASTROESOPHAGEAL REFLUX DISEASE WITHOUT ESOPHAGITIS: ICD-10-CM

## 2023-06-23 DIAGNOSIS — Z00.00 ANNUAL PHYSICAL EXAM: Primary | ICD-10-CM

## 2023-06-23 DIAGNOSIS — F41.9 ANXIETY: ICD-10-CM

## 2023-06-23 DIAGNOSIS — E06.0 ACUTE THYROIDITIS: ICD-10-CM

## 2023-06-23 DIAGNOSIS — J30.9 ALLERGIC RHINITIS, UNSPECIFIED SEASONALITY, UNSPECIFIED TRIGGER: ICD-10-CM

## 2023-06-23 PROCEDURE — 99214 OFFICE O/P EST MOD 30 MIN: CPT | Performed by: FAMILY MEDICINE

## 2023-06-23 PROCEDURE — 99395 PREV VISIT EST AGE 18-39: CPT | Performed by: FAMILY MEDICINE

## 2023-06-23 RX ORDER — FAMOTIDINE 20 MG/1
20 TABLET, FILM COATED ORAL 2 TIMES DAILY
Qty: 30 TABLET | Refills: 3 | Status: SHIPPED | OUTPATIENT
Start: 2023-06-23

## 2023-06-23 RX ORDER — MONTELUKAST SODIUM 10 MG/1
10 TABLET ORAL DAILY
Qty: 90 TABLET | Refills: 1 | Status: SHIPPED | OUTPATIENT
Start: 2023-06-23

## 2023-06-23 RX ORDER — FAMOTIDINE 10 MG
10 TABLET ORAL 2 TIMES DAILY
COMMUNITY

## 2023-06-23 RX ORDER — ESCITALOPRAM OXALATE 5 MG/1
5 TABLET ORAL DAILY
Qty: 30 TABLET | Refills: 3 | Status: SHIPPED | OUTPATIENT
Start: 2023-06-23

## 2023-06-23 NOTE — PROGRESS NOTES
ADULT ANNUAL [address]    NAME: Jojo Ndiaye  AGE: 29 y o  SEX: female  : 1995     DATE: 2023     Assessment and Plan:     Problem List Items Addressed This Visit    None      Immunizations and preventive care screenings were discussed with patient today  Appropriate education was printed on patient's after visit summary  Counseling:  · Exercise: the importance of regular exercise/physical activity was discussed  Recommend exercise 3-5 times per week for at least 30 minutes  No follow-ups on file  Chief Complaint:     Chief Complaint   Patient presents with   • Physical Exam      History of Present Illness:     Adult Annual Physical   Patient here for a comprehensive physical exam  The patient reports no problems  The patient is also here for follow-up of anxiety, allergic rhinitis, repeat labs after successfully be treated for thyroiditis and she has been experiencing daily GERD  We decided that while she is waiting for upper GI we will switch her over to twice daily H2 blocker therapy since she felt Protonix was no longer needed  Diet and Physical Activity  · Diet/Nutrition: well balanced diet  · Exercise: walking  Depression Screening  PHQ-2/9 Depression Screening    Little interest or pleasure in doing things: 0 - not at all  Feeling down, depressed, or hopeless: 0 - not at all  PHQ-2 Score: 0  PHQ-2 Interpretation: Negative depression screen       General Health  · Sleep: sleeps well  · Hearing: normal - bilateral   · Vision: no vision problems  · Dental: regular dental visits  /GYN Health  · Last menstrual period: May  · Contraceptive method: oral contraceptives  · History of STDs?: no      Review of Systems:     Review of Systems   Constitutional: Negative for appetite change, chills and fever     HENT: Negative for ear pain, facial swelling, rhinorrhea, sinus pain, sore throat and trouble swallowing  Eyes: Negative for discharge and redness  Respiratory: Negative for chest tightness, shortness of breath and wheezing  Cardiovascular: Negative for chest pain and palpitations  Gastrointestinal: Negative for abdominal pain, diarrhea, nausea and vomiting  Positive GERD   Endocrine: Negative for polyuria  Genitourinary: Negative for dysuria and urgency  Musculoskeletal: Negative for arthralgias and back pain  Skin: Negative for rash  Neurological: Negative for dizziness, weakness and headaches  Hematological: Negative for adenopathy  Psychiatric/Behavioral: Negative for behavioral problems, confusion and sleep disturbance  The patient is nervous/anxious  All other systems reviewed and are negative  Past Medical History:     Past Medical History:   Diagnosis Date   • Anxiety    • GERD (gastroesophageal reflux disease)    • Seasonal allergies       Past Surgical History:     Past Surgical History:   Procedure Laterality Date   • UPPER GASTROINTESTINAL ENDOSCOPY     • WISDOM TOOTH EXTRACTION        Social History:     Social History     Socioeconomic History   • Marital status: /Civil Union     Spouse name: None   • Number of children: None   • Years of education: None   • Highest education level: None   Occupational History   • None   Tobacco Use   • Smoking status: Never     Passive exposure: Never   • Smokeless tobacco: Never   Vaping Use   • Vaping Use: Never used   Substance and Sexual Activity   • Alcohol use: Yes     Alcohol/week: 8 0 standard drinks of alcohol     Types: 8 Standard drinks or equivalent per week   • Drug use: Never   • Sexual activity: Yes     Partners: Male     Birth control/protection: I U D     Other Topics Concern   • None   Social History Narrative   • None     Social Determinants of Health     Financial Resource Strain: Not on file   Food Insecurity: Not on file   Transportation Needs: Not on file   Physical Activity: Not on "file   Stress: Not on file   Social Connections: Not on file   Intimate Partner Violence: Not on file   Housing Stability: Not on file      Family History:     Family History   Problem Relation Age of Onset   • No Known Problems Mother    • No Known Problems Father    • No Known Problems Sister    • No Known Problems Brother    • Lung cancer Maternal Grandmother         66's   • Cancer Maternal Grandmother         Lung   • No Known Problems Maternal Grandfather    • Lung cancer Paternal Grandmother         ?76   • Diabetes Paternal Grandmother    • Cancer Paternal Grandmother         Lung   • Dementia Paternal Grandfather    • Breast cancer Paternal Aunt 50   • No Known Problems Maternal Aunt    • Breast cancer Paternal Aunt         found to be environment related      Current Medications:     Current Outpatient Medications   Medication Sig Dispense Refill   • albuterol (Ventolin HFA) 90 mcg/act inhaler Inhale 2 puffs every 6 (six) hours as needed for wheezing 18 g 5   • calcium carbonate (TUMS) 500 mg chewable tablet Chew 1 tablet as needed for indigestion or heartburn      • escitalopram (LEXAPRO) 10 mg tablet take 1 tablet by mouth once daily 90 tablet 1   • fluticasone (FLONASE) 50 mcg/act nasal spray 2 sprays into each nostril daily     • levonorgestrel (KYLEENA) 19 5 MG intrauterine device 1 Intra Uterine Device by Intrauterine route once     • Loratadine (CLARITIN PO) Take by mouth in the morning     • montelukast (SINGULAIR) 10 mg tablet take 1 tablet by mouth once daily 90 tablet 1   • triamcinolone (KENALOG) 0 5 % cream Apply topically 3 (three) times a day (Patient taking differently: Apply topically daily as needed (eczema on finger)) 30 g 0     No current facility-administered medications for this visit        Allergies:     No Known Allergies   Physical Exam:     /68   Pulse 92   Temp 97 6 °F (36 4 °C)   Ht 5' 5\" (1 651 m)   Wt 77 kg (169 lb 12 8 oz)   LMP 06/09/2023 (Approximate)   SpO2 99% " BMI 28 26 kg/m²     Physical Exam  Vitals and nursing note reviewed  Constitutional:       General: She is not in acute distress  Appearance: Normal appearance  She is not ill-appearing or diaphoretic  HENT:      Head: Normocephalic and atraumatic  Right Ear: Tympanic membrane, ear canal and external ear normal       Left Ear: Tympanic membrane, ear canal and external ear normal       Nose: Nose normal  No congestion or rhinorrhea  Mouth/Throat:      Mouth: Mucous membranes are moist       Pharynx: Oropharynx is clear  No posterior oropharyngeal erythema  Eyes:      General:         Right eye: No discharge  Left eye: No discharge  Extraocular Movements: Extraocular movements intact  Conjunctiva/sclera: Conjunctivae normal       Pupils: Pupils are equal, round, and reactive to light  Neck:      Vascular: No carotid bruit  Cardiovascular:      Rate and Rhythm: Normal rate and regular rhythm  Pulses: Normal pulses  Heart sounds: Normal heart sounds  No murmur heard  Pulmonary:      Effort: Pulmonary effort is normal  No respiratory distress  Breath sounds: Normal breath sounds  No wheezing or rhonchi  Abdominal:      General: Abdomen is flat  Bowel sounds are normal  There is no distension  Palpations: There is no mass  Tenderness: There is no abdominal tenderness  Musculoskeletal:         General: No swelling or deformity  Normal range of motion  Cervical back: Normal range of motion and neck supple  No rigidity  Right lower leg: No edema  Left lower leg: No edema  Lymphadenopathy:      Cervical: No cervical adenopathy  Skin:     General: Skin is warm and dry  Capillary Refill: Capillary refill takes less than 2 seconds  Coloration: Skin is not jaundiced  Findings: No bruising, erythema or rash  Neurological:      General: No focal deficit present        Mental Status: She is alert and oriented to person, place, and time  Cranial Nerves: No cranial nerve deficit  Sensory: No sensory deficit  Gait: Gait normal       Deep Tendon Reflexes: Reflexes normal    Psychiatric:         Mood and Affect: Mood normal          Behavior: Behavior normal          Thought Content:  Thought content normal          Judgment: Judgment normal           [address]

## 2023-07-02 ENCOUNTER — OFFICE VISIT (OUTPATIENT)
Dept: URGENT CARE | Age: 28
End: 2023-07-02
Payer: COMMERCIAL

## 2023-07-02 VITALS
DIASTOLIC BLOOD PRESSURE: 66 MMHG | SYSTOLIC BLOOD PRESSURE: 110 MMHG | TEMPERATURE: 97.6 F | RESPIRATION RATE: 12 BRPM | OXYGEN SATURATION: 100 % | HEART RATE: 97 BPM

## 2023-07-02 DIAGNOSIS — H69.93 DISORDER OF BOTH EUSTACHIAN TUBES: Primary | ICD-10-CM

## 2023-07-02 PROCEDURE — 99213 OFFICE O/P EST LOW 20 MIN: CPT | Performed by: PHYSICIAN ASSISTANT

## 2023-07-02 RX ORDER — PREDNISONE 10 MG/1
TABLET ORAL
Qty: 21 TABLET | Refills: 0 | Status: SHIPPED | OUTPATIENT
Start: 2023-07-02

## 2023-07-02 NOTE — PROGRESS NOTES
[facility]        NAME: Rich England is a 29 y.o. female  : 1995    MRN: [id number]  DATE: 2023  TIME: 7:45 PM    Assessment and Plan   Disorder of both eustachian tubes [H69.93]  1. Disorder of both eustachian tubes  predniSONE 10 mg tablet            Patient Instructions       Follow up with PCP in 3-5 days. Proceed to  ER if symptoms worsen. Chief Complaint     Chief Complaint   Patient presents with   • Earache     Started Monday; left worse than right; pain and feeling clogged         History of Present Illness       Patient is here for evaluation of ear pain and pressure. She has been having some other upper respiratory symptoms like congestion and runny nose. Symptoms are improving. Earache   Pertinent negatives include no ear discharge, rhinorrhea or sore throat. Review of Systems   Review of Systems   Constitutional: Negative. HENT: Positive for congestion and ear pain. Negative for ear discharge, postnasal drip, rhinorrhea, sinus pressure, sinus pain, sore throat and trouble swallowing. Eyes: Negative. Respiratory: Negative. Cardiovascular: Negative. Gastrointestinal: Negative. Neurological: Negative.           Current Medications       Current Outpatient Medications:   •  predniSONE 10 mg tablet, Six tablets day 1; 5 tablets day 2; 4 tablets day 3; 3 tablets day 4; 2 tablets day 5; and 1 tablet day 6, Disp: 21 tablet, Rfl: 0  •  albuterol (Ventolin HFA) 90 mcg/act inhaler, Inhale 2 puffs every 6 (six) hours as needed for wheezing, Disp: 18 g, Rfl: 5  •  calcium carbonate (TUMS) 500 mg chewable tablet, Chew 1 tablet as needed for indigestion or heartburn , Disp: , Rfl:   •  escitalopram (LEXAPRO) 5 mg tablet, Take 1 tablet (5 mg total) by mouth daily, Disp: 30 tablet, Rfl: 3  •  famotidine (PEPCID) 10 mg tablet, Take 10 mg by mouth 2 (two) times a day, Disp: , Rfl:   •  famotidine (PEPCID) 20 mg tablet, Take 1 tablet (20 mg total) by mouth 2 (two) times a day, Disp: 30 tablet, Rfl: 3  •  fluticasone (FLONASE) 50 mcg/act nasal spray, 2 sprays into each nostril daily, Disp: , Rfl:   •  levonorgestrel (KYLEENA) 19.5 MG intrauterine device, 1 Intra Uterine Device by Intrauterine route once, Disp: , Rfl:   •  Loratadine (CLARITIN PO), Take by mouth in the morning, Disp: , Rfl:   •  montelukast (SINGULAIR) 10 mg tablet, Take 1 tablet (10 mg total) by mouth daily, Disp: 90 tablet, Rfl: 1  •  triamcinolone (KENALOG) 0.5 % cream, Apply topically 3 (three) times a day (Patient taking differently: Apply topically daily as needed (eczema on finger)), Disp: 30 g, Rfl: 0    Current Allergies     Allergies as of 07/02/2023   • (No Known Allergies)            The following portions of the patient's history were reviewed and updated as appropriate: allergies, current medications, past family history, past medical history, past social history, past surgical history and problem list.     Past Medical History:   Diagnosis Date   • Anxiety    • GERD (gastroesophageal reflux disease)    • Seasonal allergies        Past Surgical History:   Procedure Laterality Date   • UPPER GASTROINTESTINAL ENDOSCOPY     • WISDOM TOOTH EXTRACTION         Family History   Problem Relation Age of Onset   • No Known Problems Mother    • No Known Problems Father    • No Known Problems Sister    • No Known Problems Brother    • Lung cancer Maternal Grandmother         66's   • Cancer Maternal Grandmother         Lung   • No Known Problems Maternal Grandfather    • Lung cancer Paternal Grandmother         ?76   • Diabetes Paternal Grandmother    • Cancer Paternal Grandmother         Lung   • Dementia Paternal Grandfather    • Breast cancer Paternal Aunt 50   • No Known Problems Maternal Aunt    • Breast cancer Paternal Aunt         found to be environment related         Medications have been verified.         Objective   /66 (BP Location: Left arm, Patient Position: Sitting, Cuff Size: Large)   Pulse 97   Temp 97.6 °F (36.4 °C) (Temporal)   Resp 12   LMP 06/09/2023 (Approximate)   SpO2 100%   Patient's last menstrual period was 06/09/2023 (approximate). Physical Exam     Physical Exam  Vitals and nursing note reviewed. Constitutional:       General: She is not in acute distress. Appearance: Normal appearance. She is well-developed. She is not ill-appearing, toxic-appearing or diaphoretic. HENT:      Head: Normocephalic and atraumatic. Right Ear: Ear canal normal. No drainage, swelling or tenderness. A middle ear effusion (Small amount of clear) is present. Tympanic membrane is not perforated, erythematous, retracted or bulging. Left Ear: Ear canal normal. No drainage, swelling or tenderness. A middle ear effusion (Clear) is present. Tympanic membrane is bulging. Tympanic membrane is not perforated, erythematous or retracted. Nose: Congestion present. No rhinorrhea. Mouth/Throat:      Mouth: Mucous membranes are moist.      Pharynx: No oropharyngeal exudate or posterior oropharyngeal erythema. Eyes:      General:         Right eye: No discharge. Left eye: No discharge. Extraocular Movements: Extraocular movements intact. Conjunctiva/sclera: Conjunctivae normal.      Pupils: Pupils are equal, round, and reactive to light. Cardiovascular:      Rate and Rhythm: Normal rate and regular rhythm. Heart sounds: Normal heart sounds. No murmur heard. Pulmonary:      Effort: Pulmonary effort is normal. No respiratory distress. Breath sounds: Normal breath sounds. No stridor. No wheezing, rhonchi or rales. Skin:     General: Skin is warm and dry. Findings: No rash. Neurological:      General: No focal deficit present. Mental Status: She is alert and oriented to person, place, and time. Psychiatric:         Mood and Affect: Mood normal.         Behavior: Behavior normal.         Thought Content:  Thought content normal. Judgment: Judgment normal.

## 2023-07-02 NOTE — PATIENT INSTRUCTIONS
Use a humidifier at bedtime    Continue with your current medications    Follow-up with your primary care physician if symptoms persist    Go to emergency room if your symptoms are worsening

## 2023-09-05 DIAGNOSIS — K21.9 GASTROESOPHAGEAL REFLUX DISEASE WITHOUT ESOPHAGITIS: ICD-10-CM

## 2023-09-05 RX ORDER — FAMOTIDINE 20 MG/1
20 TABLET, FILM COATED ORAL 2 TIMES DAILY
Qty: 30 TABLET | Refills: 3 | Status: SHIPPED | OUTPATIENT
Start: 2023-09-05

## 2023-10-19 DIAGNOSIS — F41.9 ANXIETY: ICD-10-CM

## 2023-10-19 RX ORDER — ESCITALOPRAM OXALATE 5 MG/1
5 TABLET ORAL DAILY
Qty: 30 TABLET | Refills: 3 | Status: SHIPPED | OUTPATIENT
Start: 2023-10-19

## 2023-12-21 ENCOUNTER — OFFICE VISIT (OUTPATIENT)
Dept: OBGYN CLINIC | Facility: CLINIC | Age: 28
End: 2023-12-21
Payer: COMMERCIAL

## 2023-12-21 VITALS
WEIGHT: 169 LBS | DIASTOLIC BLOOD PRESSURE: 62 MMHG | HEIGHT: 65 IN | SYSTOLIC BLOOD PRESSURE: 102 MMHG | BODY MASS INDEX: 28.16 KG/M2

## 2023-12-21 DIAGNOSIS — N92.1 BREAKTHROUGH BLEEDING WITH IUD: Primary | ICD-10-CM

## 2023-12-21 DIAGNOSIS — Z97.5 BREAKTHROUGH BLEEDING WITH IUD: Primary | ICD-10-CM

## 2023-12-21 PROCEDURE — 99213 OFFICE O/P EST LOW 20 MIN: CPT | Performed by: OBSTETRICS & GYNECOLOGY

## 2023-12-22 NOTE — PROGRESS NOTES
"Assessment/Plan:    No problem-specific Assessment & Plan notes found for this encounter.       Diagnoses and all orders for this visit:    Breakthrough bleeding with IUD    - reassured pt that IUD is in the correct location  - reviewed irreg bleeding is the most common complaint  - monitor bleeding at this point  - return for annual visit    I have spent a total time of 20 minutes on 12/21/23 in caring for this patient including Prognosis, Risks and benefits of tx options, Instructions for management, Impressions, and Obtaining or reviewing history  .    Subjective:      Patient ID: Danielle Andrews is a 28 y.o. female.    HPI  28y G0 presents with some irreg bleeding with her IUD.  She has a Kyleena since 2020.  Since the fall, she has had some light irreg bleeding with with cramping.  No other complaints.  Overall she is happy with the IUD.  She would like to confirm it is in place.    The following portions of the patient's history were reviewed and updated as appropriate: allergies, current medications, past family history, past medical history, past social history, past surgical history, and problem list.    Review of Systems   Constitutional: Negative.    HENT: Negative.     Respiratory: Negative.     Cardiovascular: Negative.    Gastrointestinal: Negative.    Genitourinary:  Positive for menstrual problem.   Neurological: Negative.          Objective:      /62   Ht 5' 5\" (1.651 m)   Wt 76.7 kg (169 lb)   LMP 12/08/2023   BMI 28.12 kg/m²          Physical Exam  Vitals and nursing note reviewed.   Constitutional:       Appearance: Normal appearance. She is normal weight.   HENT:      Head: Normocephalic and atraumatic.   Eyes:      Extraocular Movements: Extraocular movements intact.      Conjunctiva/sclera: Conjunctivae normal.      Pupils: Pupils are equal, round, and reactive to light.   Pulmonary:      Effort: Pulmonary effort is normal.   Genitourinary:     Labia:         Right: No rash, " tenderness, lesion or injury.         Left: No rash, tenderness, lesion or injury.       Vagina: Normal. No vaginal discharge, tenderness or bleeding.      Cervix: Normal.      Comments: IUD strings 3 cm long  Skin:     General: Skin is warm and dry.   Neurological:      General: No focal deficit present.      Mental Status: She is alert and oriented to person, place, and time.   Psychiatric:         Mood and Affect: Mood normal.         Behavior: Behavior normal.

## 2023-12-25 DIAGNOSIS — J30.9 ALLERGIC RHINITIS, UNSPECIFIED SEASONALITY, UNSPECIFIED TRIGGER: ICD-10-CM

## 2023-12-27 RX ORDER — MONTELUKAST SODIUM 10 MG/1
10 TABLET ORAL DAILY
Qty: 90 TABLET | Refills: 1 | Status: SHIPPED | OUTPATIENT
Start: 2023-12-27

## 2024-03-01 DIAGNOSIS — F41.9 ANXIETY: ICD-10-CM

## 2024-03-01 RX ORDER — ESCITALOPRAM OXALATE 5 MG/1
5 TABLET ORAL DAILY
Qty: 30 TABLET | Refills: 5 | Status: SHIPPED | OUTPATIENT
Start: 2024-03-01

## 2024-05-09 ENCOUNTER — ANNUAL EXAM (OUTPATIENT)
Dept: OBGYN CLINIC | Facility: CLINIC | Age: 29
End: 2024-05-09
Payer: COMMERCIAL

## 2024-05-09 VITALS
SYSTOLIC BLOOD PRESSURE: 116 MMHG | DIASTOLIC BLOOD PRESSURE: 72 MMHG | BODY MASS INDEX: 27.99 KG/M2 | HEIGHT: 65 IN | WEIGHT: 168 LBS

## 2024-05-09 DIAGNOSIS — Z01.419 WOMEN'S ANNUAL ROUTINE GYNECOLOGICAL EXAMINATION: Primary | ICD-10-CM

## 2024-05-09 PROCEDURE — 99395 PREV VISIT EST AGE 18-39: CPT | Performed by: OBSTETRICS & GYNECOLOGY

## 2024-05-10 NOTE — PROGRESS NOTES
ASSESSMENT & PLAN:   Diagnoses and all orders for this visit:    Women's annual routine gynecological examination          The following were reviewed in today's visit: ASCCP guidelines, Gardisil vaccination, STD testing breast self exam, family planning choices, exercise, and healthy diet.    Patient to return to office in yearly for annual exam.     All questions have been answered to her satisfaction.        CC:  Annual Gynecologic Examination  Chief Complaint   Patient presents with    Gynecologic Exam     Patient is here today for her yearly exam, pap up to date(22-wnl). Patient is doing well, she has no concerns at this time.       HPI: Danielle Andrews is a 29 y.o.  who presents for annual gynecologic examination.  She has the following concerns:  none, considering childbearing in 1-2 years      Health Maintenance:    Exercise: frequently  Breast exams/breast awareness: yes    Past Medical History:   Diagnosis Date    Anxiety     GERD (gastroesophageal reflux disease)     Seasonal allergies        Past Surgical History:   Procedure Laterality Date    UPPER GASTROINTESTINAL ENDOSCOPY      WISDOM TOOTH EXTRACTION         Past OB/Gyn History:   No LMP recorded. Patient has had an implant.    Last Pap:  : no abnormalities  History of abnormal Pap smear: no  HPV vaccine completed: no    Patient is currently sexually active.   STD testing: no  Current contraception: IUD      Family History  Family History   Problem Relation Age of Onset    No Known Problems Mother     No Known Problems Father     No Known Problems Sister     No Known Problems Brother     Lung cancer Maternal Grandmother         70's    Cancer Maternal Grandmother         Lung    No Known Problems Maternal Grandfather     Lung cancer Paternal Grandmother         ?75    Diabetes Paternal Grandmother     Cancer Paternal Grandmother         Lung    Dementia Paternal Grandfather     Breast cancer Paternal Aunt 46    No Known Problems  Maternal Aunt     Breast cancer Paternal Aunt         found to be environment related       Family history of uterine or ovarian cancer: no  Family history of breast cancer: yes  Family history of colon cancer: no    Social History:  Social History     Socioeconomic History    Marital status: /Civil Union     Spouse name: Not on file    Number of children: Not on file    Years of education: Not on file    Highest education level: Not on file   Occupational History    Not on file   Tobacco Use    Smoking status: Never     Passive exposure: Never    Smokeless tobacco: Never   Vaping Use    Vaping status: Never Used   Substance and Sexual Activity    Alcohol use: Yes     Alcohol/week: 8.0 standard drinks of alcohol     Types: 8 Standard drinks or equivalent per week    Drug use: Never    Sexual activity: Yes     Partners: Male     Birth control/protection: I.U.D.   Other Topics Concern    Not on file   Social History Narrative    Not on file     Social Determinants of Health     Financial Resource Strain: Not on file   Food Insecurity: Not on file   Transportation Needs: Not on file   Physical Activity: Not on file   Stress: Not on file   Social Connections: Not on file   Intimate Partner Violence: Not on file   Housing Stability: Not on file     Domestic violence screen: negative    Allergies:  No Known Allergies    Medications:    Current Outpatient Medications:     albuterol (Ventolin HFA) 90 mcg/act inhaler, Inhale 2 puffs every 6 (six) hours as needed for wheezing, Disp: 18 g, Rfl: 5    calcium carbonate (TUMS) 500 mg chewable tablet, Chew 1 tablet as needed for indigestion or heartburn , Disp: , Rfl:     escitalopram (LEXAPRO) 5 mg tablet, take 1 tablet by mouth once daily, Disp: 30 tablet, Rfl: 5    famotidine (PEPCID) 20 mg tablet, take 1 tablet by mouth twice a day, Disp: 30 tablet, Rfl: 3    fluticasone (FLONASE) 50 mcg/act nasal spray, 2 sprays into each nostril daily, Disp: , Rfl:     levonorgestrel  "(KYLEENA) 19.5 MG intrauterine device, 1 Intra Uterine Device by Intrauterine route once, Disp: , Rfl:     Loratadine (CLARITIN PO), Take by mouth in the morning, Disp: , Rfl:     montelukast (SINGULAIR) 10 mg tablet, take 1 tablet by mouth once daily, Disp: 90 tablet, Rfl: 1    triamcinolone (KENALOG) 0.5 % cream, Apply topically 3 (three) times a day (Patient taking differently: Apply topically daily as needed (eczema on finger)), Disp: 30 g, Rfl: 0    Review of Systems:  Review of Systems   Constitutional: Negative.    HENT: Negative.     Respiratory: Negative.     Cardiovascular: Negative.    Gastrointestinal: Negative.    Genitourinary: Negative.    Neurological: Negative.    Psychiatric/Behavioral: Negative.           Physical Exam:  /72 (BP Location: Right arm, Patient Position: Sitting, Cuff Size: Large)   Ht 5' 5\" (1.651 m)   Wt 76.2 kg (168 lb)   BMI 27.96 kg/m²    Physical Exam  Constitutional:       Appearance: Normal appearance.   Genitourinary:      Bladder and urethral meatus normal.      No lesions in the vagina.      Right Labia: No rash, tenderness, lesions, skin changes or Bartholin's cyst.     Left Labia: No tenderness, lesions, skin changes, Bartholin's cyst or rash.     No vaginal erythema, tenderness or bleeding.        Right Adnexa: not tender, not full and no mass present.     Left Adnexa: not tender, not full and no mass present.     Cervix is nulliparous.      No cervical motion tenderness, discharge, lesion or polyp.      Uterus is not enlarged, fixed or tender.      No uterine mass detected.     Urethral meatus caruncle not present.     No urethral tenderness or mass present.   Breasts:     Right: No swelling, bleeding, inverted nipple, mass, nipple discharge, skin change or tenderness.      Left: No swelling, bleeding, inverted nipple, mass, nipple discharge, skin change or tenderness.   HENT:      Head: Normocephalic and atraumatic.   Eyes:      Extraocular Movements: " Extraocular movements intact.      Conjunctiva/sclera: Conjunctivae normal.      Pupils: Pupils are equal, round, and reactive to light.   Cardiovascular:      Rate and Rhythm: Normal rate and regular rhythm.      Heart sounds: Normal heart sounds. No murmur heard.  Pulmonary:      Effort: Pulmonary effort is normal. No respiratory distress.      Breath sounds: Normal breath sounds. No wheezing or rales.   Abdominal:      General: There is no distension.      Palpations: Abdomen is soft.      Tenderness: There is no abdominal tenderness. There is no guarding.   Neurological:      General: No focal deficit present.      Mental Status: She is alert and oriented to person, place, and time.   Skin:     General: Skin is warm and dry.   Psychiatric:         Mood and Affect: Mood normal.         Behavior: Behavior normal.   Vitals and nursing note reviewed.

## 2024-07-09 DIAGNOSIS — J30.9 ALLERGIC RHINITIS, UNSPECIFIED SEASONALITY, UNSPECIFIED TRIGGER: ICD-10-CM

## 2024-07-09 RX ORDER — MONTELUKAST SODIUM 10 MG/1
10 TABLET ORAL DAILY
Qty: 90 TABLET | Refills: 1 | Status: SHIPPED | OUTPATIENT
Start: 2024-07-09

## 2024-07-17 ENCOUNTER — OFFICE VISIT (OUTPATIENT)
Dept: FAMILY MEDICINE CLINIC | Facility: CLINIC | Age: 29
End: 2024-07-17
Payer: COMMERCIAL

## 2024-07-17 VITALS
TEMPERATURE: 97.6 F | BODY MASS INDEX: 27.82 KG/M2 | OXYGEN SATURATION: 96 % | HEART RATE: 105 BPM | HEIGHT: 65 IN | DIASTOLIC BLOOD PRESSURE: 64 MMHG | SYSTOLIC BLOOD PRESSURE: 110 MMHG | WEIGHT: 167 LBS

## 2024-07-17 DIAGNOSIS — H69.93 EUSTACHIAN TUBE DYSFUNCTION, BILATERAL: ICD-10-CM

## 2024-07-17 DIAGNOSIS — Z00.00 ANNUAL PHYSICAL EXAM: Primary | ICD-10-CM

## 2024-07-17 DIAGNOSIS — K21.9 GASTROESOPHAGEAL REFLUX DISEASE WITHOUT ESOPHAGITIS: ICD-10-CM

## 2024-07-17 PROCEDURE — 99395 PREV VISIT EST AGE 18-39: CPT | Performed by: FAMILY MEDICINE

## 2024-07-17 PROCEDURE — 99214 OFFICE O/P EST MOD 30 MIN: CPT | Performed by: FAMILY MEDICINE

## 2024-07-17 RX ORDER — FAMOTIDINE 20 MG/1
TABLET, FILM COATED ORAL
Qty: 90 TABLET | Refills: 5 | Status: SHIPPED | OUTPATIENT
Start: 2024-07-17

## 2024-07-17 RX ORDER — CEFUROXIME AXETIL 500 MG/1
500 TABLET ORAL EVERY 12 HOURS SCHEDULED
Qty: 20 TABLET | Refills: 0 | Status: SHIPPED | OUTPATIENT
Start: 2024-07-17 | End: 2024-07-27

## 2024-07-17 RX ORDER — PREDNISONE 10 MG/1
TABLET ORAL
Qty: 26 TABLET | Refills: 0 | Status: SHIPPED | OUTPATIENT
Start: 2024-07-17

## 2024-07-17 NOTE — PATIENT INSTRUCTIONS
"Patient Education     Routine physical for adults   The Basics   Written by the doctors and editors at [facility]   What is a physical? -- A physical is a routine visit, or \"check-up,\" with your doctor. You might also hear it called a \"wellness visit\" or \"preventive visit.\"  During each visit, the doctor will:   Ask about your physical and mental health   Ask about your habits, behaviors, and lifestyle   Do an exam   Give you vaccines if needed   Talk to you about any medicines you take   Give advice about your health   Answer your questions  Getting regular check-ups is an important part of taking care of your health. It can help your doctor find and treat any problems you have. But it's also important for preventing health problems.  A routine physical is different from a \"sick visit.\" A sick visit is when you see a doctor because of a health concern or problem. Since physicals are scheduled ahead of time, you can think about what you want to ask the doctor.  How often should I get a physical? -- It depends on your age and health. In general, for people age 21 years and older:   If you are younger than 50 years, you might be able to get a physical every 3 years.   If you are 50 years or older, your doctor might recommend a physical every year.  If you have an ongoing health condition, like diabetes or high blood pressure, your doctor will probably want to see you more often.  What happens during a physical? -- In general, each visit will include:   Physical exam - The doctor or nurse will check your height, weight, heart rate, and blood pressure. They will also look at your eyes and ears. They will ask about how you are feeling and whether you have any symptoms that bother you.   Medicines - It's a good idea to bring a list of all the medicines you take to each doctor visit. Your doctor will talk to you about your medicines and answer any questions. Tell them if you are having any side effects that bother you. You " "should also tell them if you are having trouble paying for any of your medicines.   Habits and behaviors - This includes:   Your diet   Your exercise habits   Whether you smoke, drink alcohol, or use drugs   Whether you are sexually active   Whether you feel safe at home  Your doctor will talk to you about things you can do to improve your health and lower your risk of health problems. They will also offer help and support. For example, if you want to quit smoking, they can give you advice and might prescribe medicines. If you want to improve your diet or get more physical activity, they can help you with this, too.   Lab tests, if needed - The tests you get will depend on your age and situation. For example, your doctor might want to check your:   Cholesterol   Blood sugar   Iron level   Vaccines - The recommended vaccines will depend on your age, health, and what vaccines you already had. Vaccines are very important because they can prevent certain serious or deadly infections.   Discussion of screening - \"Screening\" means checking for diseases or other health problems before they cause symptoms. Your doctor can recommend screening based on your age, risk, and preferences. This might include tests to check for:   Cancer, such as breast, prostate, cervical, ovarian, colorectal, prostate, lung, or skin cancer   Sexually transmitted infections, such as chlamydia and gonorrhea   Mental health conditions like depression and anxiety  Your doctor will talk to you about the different types of screening tests. They can help you decide which screenings to have. They can also explain what the results might mean.   Answering questions - The physical is a good time to ask the doctor or nurse questions about your health. If needed, they can refer you to other doctors or specialists, too.  Adults older than 65 years often need other care, too. As you get older, your doctor will talk to you about:   How to prevent falling at " home   Hearing or vision tests   Memory testing   How to take your medicines safely   Making sure that you have the help and support you need at home  All topics are updated as new evidence becomes available and our peer review process is complete.  This topic retrieved from [employer] on: May 02, 2024.  Topic 009584 Version 1.0  Release: 32.4.3 - C32.122  © 2024 UpToDate, Inc[employer] and/or its affiliates. All rights reserved.  Consumer Information Use and Disclaimer   Disclaimer: This generalized information is a limited summary of diagnosis, treatment, and/or medication information. It is not meant to be comprehensive and should be used as a tool to help the user understand and/or assess potential diagnostic and treatment options. It does NOT include all information about conditions, treatments, medications, side effects, or risks that may apply to a specific patient. It is not intended to be medical advice or a substitute for the medical advice, diagnosis, or treatment of a health care provider based on the health care provider's examination and assessment of a patient's specific and unique circumstances. Patients must speak with a health care provider for complete information about their health, medical questions, and treatment options, including any risks or benefits regarding use of medications. This information does not endorse any treatments or medications as safe, effective, or approved for treating a specific patient. Up[employer]Date, Inc. and its affiliates disclaim any warranty or liability relating to this information or the use thereof.The use of this information is governed by the Terms of Use, available at https://www.woltersNuvotronicsuwer.com/en/know/clinical-effectiveness-terms. 2024© UpToDate, Inc. and its affiliates and/or licensors. All rights reserved.  Copyright   © 2024 Up[employer]Date, Inc. and/or its affiliates. All rights reserved.

## 2024-07-17 NOTE — PROGRESS NOTES
Adult Annual Physical  Name: Danielle Andrews      : 1995      MRN: [id number]  Encounter Provider: Emily Echols DO  Encounter Date: 2024   Encounter department: [facility]    Assessment & Plan     Immunizations and preventive care screenings were discussed with patient today. Appropriate education was printed on patient's after visit summary.    Counseling:  Exercise: the importance of regular exercise/physical activity was discussed. Recommend exercise 3-5 times per week for at least 30 minutes.          History of Present Illness     Pt complains of crackling and pressure in both ears   Adult Annual Physical:  Patient presents for annual physical.     Diet and Physical Activity:  - Diet/Nutrition: well balanced diet.  - Exercise: moderate cardiovascular exercise.    Depression Screening:  - PHQ-2 Score: 0    General Health:  - Sleep: 4-6 hours of sleep on average.  - Hearing: normal hearing right ear.  - Vision: no vision problems.  - Dental: regular dental visits.    /GYN Health:  - Follows with GYN: no.   - Menopause: postmenopausal.   - History of STDs: no  - Contraception: IUD placement.      Advanced Care Planning:  - Has an advanced directive?: no    - Has a durable medical POA?: no    - ACP document given to patient?: yes      Review of Systems   Constitutional:  Negative for appetite change, chills and fever.   HENT:  Negative for ear pain, facial swelling, rhinorrhea, sinus pain, sore throat and trouble swallowing.         + ear pressure and crackling bilaterally   Eyes:  Negative for discharge and redness.   Respiratory:  Negative for chest tightness, shortness of breath and wheezing.    Cardiovascular:  Negative for chest pain and palpitations.   Gastrointestinal:  Negative for abdominal pain, diarrhea, nausea and vomiting.   Endocrine: Negative for polyuria.   Genitourinary:  Negative for dysuria and urgency.   Musculoskeletal:  Negative for  "arthralgias and back pain.   Skin:  Negative for rash.   Neurological:  Negative for dizziness, weakness and headaches.   Hematological:  Negative for adenopathy.   Psychiatric/Behavioral:  Negative for behavioral problems, confusion and sleep disturbance.    All other systems reviewed and are negative.    Pertinent Medical History   GERD- controlled on current therapy        Objective     /64 (BP Location: Right arm, Patient Position: Sitting, Cuff Size: Large)   Pulse 105   Temp 97.6 °F (36.4 °C)   Ht 5' 5\" (1.651 m)   Wt 75.8 kg (167 lb)   LMP 07/15/2024 (Exact Date)   SpO2 96%   BMI 27.79 kg/m²     Physical Exam  Constitutional:       General: She is not in acute distress.     Appearance: She is well-developed.   HENT:      Head: Normocephalic and atraumatic.      Right Ear: Ear canal and external ear normal. There is no impacted cerumen.      Left Ear: Ear canal and external ear normal. There is no impacted cerumen.      Ears:      Comments: Tm dull with effusions bilaterally     Nose: Nose normal.      Mouth/Throat:      Pharynx: No oropharyngeal exudate.   Eyes:      General: No scleral icterus.        Right eye: No discharge.         Left eye: No discharge.      Conjunctiva/sclera: Conjunctivae normal.      Pupils: Pupils are equal, round, and reactive to light.   Neck:      Thyroid: No thyromegaly.      Vascular: No JVD.      Trachea: No tracheal deviation.   Cardiovascular:      Rate and Rhythm: Normal rate and regular rhythm.      Heart sounds: Normal heart sounds. No murmur heard.  Pulmonary:      Effort: No respiratory distress.      Breath sounds: Normal breath sounds. No stridor. No wheezing or rales.   Abdominal:      General: Bowel sounds are normal. There is no distension.      Palpations: Abdomen is soft. There is no mass.      Tenderness: There is no abdominal tenderness. There is no guarding or rebound.   Musculoskeletal:         General: No tenderness. Normal range of motion.      " Cervical back: Normal range of motion and neck supple.   Lymphadenopathy:      Cervical: No cervical adenopathy.   Skin:     General: Skin is warm.      Findings: No erythema or rash.   Neurological:      Mental Status: She is alert and oriented to person, place, and time.      Cranial Nerves: No cranial nerve deficit.      Motor: No abnormal muscle tone.      Coordination: Coordination normal.      Deep Tendon Reflexes: Reflexes are normal and symmetric. Reflexes normal.   Psychiatric:         Behavior: Behavior normal.         Thought Content: Thought content normal.         Judgment: Judgment normal.       Administrative Statements   I have spent a total time of 20 minutes in caring for this patient on the day of the visit/encounter including Diagnostic results, Prognosis, Risks and benefits of tx options, Instructions for management, and Patient and family education.

## 2024-07-22 ENCOUNTER — APPOINTMENT (EMERGENCY)
Dept: VASCULAR ULTRASOUND | Facility: HOSPITAL | Age: 29
End: 2024-07-22
Payer: COMMERCIAL

## 2024-07-22 ENCOUNTER — HOSPITAL ENCOUNTER (EMERGENCY)
Facility: HOSPITAL | Age: 29
Discharge: HOME | End: 2024-07-22
Attending: EMERGENCY MEDICINE
Payer: COMMERCIAL

## 2024-07-22 VITALS
OXYGEN SATURATION: 98 % | RESPIRATION RATE: 18 BRPM | DIASTOLIC BLOOD PRESSURE: 58 MMHG | TEMPERATURE: 98.3 F | SYSTOLIC BLOOD PRESSURE: 115 MMHG | HEART RATE: 71 BPM

## 2024-07-22 DIAGNOSIS — Z86.69 HISTORY OF EAR INFECTION: ICD-10-CM

## 2024-07-22 DIAGNOSIS — M79.604 BILATERAL LEG PAIN: Primary | ICD-10-CM

## 2024-07-22 DIAGNOSIS — M79.605 BILATERAL LEG PAIN: Primary | ICD-10-CM

## 2024-07-22 LAB
ALBUMIN SERPL BCG-MCNC: 5 G/DL (ref 3.5–5)
ALP SERPL-CCNC: 56 U/L (ref 34–104)
ALT SERPL W P-5'-P-CCNC: 21 U/L (ref 7–52)
ANION GAP SERPL CALCULATED.3IONS-SCNC: 7 MMOL/L (ref 4–13)
AST SERPL W P-5'-P-CCNC: 14 U/L (ref 13–39)
BACTERIA UR QL AUTO: ABNORMAL /HPF
BASOPHILS # BLD AUTO: 0.03 THOUSANDS/ÂΜL (ref 0–0.1)
BASOPHILS NFR BLD AUTO: 0 % (ref 0–1)
BILIRUB SERPL-MCNC: 0.68 MG/DL (ref 0.2–1)
BILIRUB UR QL STRIP: NEGATIVE
BUN SERPL-MCNC: 8 MG/DL (ref 5–25)
CALCIUM SERPL-MCNC: 10 MG/DL (ref 8.4–10.2)
CHLORIDE SERPL-SCNC: 103 MMOL/L (ref 96–108)
CK SERPL-CCNC: 42 U/L (ref 26–192)
CLARITY UR: CLEAR
CO2 SERPL-SCNC: 30 MMOL/L (ref 21–32)
COLOR UR: ABNORMAL
CREAT SERPL-MCNC: 0.62 MG/DL (ref 0.6–1.3)
EOSINOPHIL # BLD AUTO: 0.04 THOUSAND/ÂΜL (ref 0–0.61)
EOSINOPHIL NFR BLD AUTO: 1 % (ref 0–6)
ERYTHROCYTE [DISTWIDTH] IN BLOOD BY AUTOMATED COUNT: 12 % (ref 11.6–15.1)
EXT PREGNANCY TEST URINE: NEGATIVE
EXT. CONTROL: NORMAL
GFR SERPL CREATININE-BSD FRML MDRD: 122 ML/MIN/1.73SQ M
GLUCOSE SERPL-MCNC: 86 MG/DL (ref 65–140)
GLUCOSE UR STRIP-MCNC: NEGATIVE MG/DL
HCT VFR BLD AUTO: 44 % (ref 34.8–46.1)
HGB BLD-MCNC: 15.4 G/DL (ref 11.5–15.4)
HGB UR QL STRIP.AUTO: ABNORMAL
IMM GRANULOCYTES # BLD AUTO: 0.03 THOUSAND/UL (ref 0–0.2)
IMM GRANULOCYTES NFR BLD AUTO: 0 % (ref 0–2)
KETONES UR STRIP-MCNC: NEGATIVE MG/DL
LEUKOCYTE ESTERASE UR QL STRIP: NEGATIVE
LYMPHOCYTES # BLD AUTO: 2.24 THOUSANDS/ÂΜL (ref 0.6–4.47)
LYMPHOCYTES NFR BLD AUTO: 27 % (ref 14–44)
MCH RBC QN AUTO: 33.1 PG (ref 26.8–34.3)
MCHC RBC AUTO-ENTMCNC: 35 G/DL (ref 31.4–37.4)
MCV RBC AUTO: 95 FL (ref 82–98)
MONOCYTES # BLD AUTO: 0.65 THOUSAND/ÂΜL (ref 0.17–1.22)
MONOCYTES NFR BLD AUTO: 8 % (ref 4–12)
NEUTROPHILS # BLD AUTO: 5.25 THOUSANDS/ÂΜL (ref 1.85–7.62)
NEUTS SEG NFR BLD AUTO: 64 % (ref 43–75)
NITRITE UR QL STRIP: NEGATIVE
NON-SQ EPI CELLS URNS QL MICRO: ABNORMAL /HPF
NRBC BLD AUTO-RTO: 0 /100 WBCS
PH UR STRIP.AUTO: 7.5 [PH]
PLATELET # BLD AUTO: 288 THOUSANDS/UL (ref 149–390)
PMV BLD AUTO: 10 FL (ref 8.9–12.7)
POTASSIUM SERPL-SCNC: 3.6 MMOL/L (ref 3.5–5.3)
PROT SERPL-MCNC: 8.2 G/DL (ref 6.4–8.4)
PROT UR STRIP-MCNC: NEGATIVE MG/DL
RBC # BLD AUTO: 4.65 MILLION/UL (ref 3.81–5.12)
RBC #/AREA URNS AUTO: ABNORMAL /HPF
SODIUM SERPL-SCNC: 140 MMOL/L (ref 135–147)
SP GR UR STRIP.AUTO: 1.01 (ref 1–1.03)
UROBILINOGEN UR STRIP-ACNC: <2 MG/DL
WBC # BLD AUTO: 8.24 THOUSAND/UL (ref 4.31–10.16)
WBC #/AREA URNS AUTO: ABNORMAL /HPF

## 2024-07-22 PROCEDURE — 93970 EXTREMITY STUDY: CPT | Performed by: SURGERY

## 2024-07-22 PROCEDURE — 99284 EMERGENCY DEPT VISIT MOD MDM: CPT

## 2024-07-22 PROCEDURE — 80053 COMPREHEN METABOLIC PANEL: CPT | Performed by: EMERGENCY MEDICINE

## 2024-07-22 PROCEDURE — 99284 EMERGENCY DEPT VISIT MOD MDM: CPT | Performed by: EMERGENCY MEDICINE

## 2024-07-22 PROCEDURE — 81001 URINALYSIS AUTO W/SCOPE: CPT | Performed by: EMERGENCY MEDICINE

## 2024-07-22 PROCEDURE — 82550 ASSAY OF CK (CPK): CPT | Performed by: EMERGENCY MEDICINE

## 2024-07-22 PROCEDURE — 36415 COLL VENOUS BLD VENIPUNCTURE: CPT | Performed by: EMERGENCY MEDICINE

## 2024-07-22 PROCEDURE — 93970 EXTREMITY STUDY: CPT

## 2024-07-22 PROCEDURE — 81025 URINE PREGNANCY TEST: CPT | Performed by: EMERGENCY MEDICINE

## 2024-07-22 PROCEDURE — 85025 COMPLETE CBC W/AUTO DIFF WBC: CPT | Performed by: EMERGENCY MEDICINE

## 2024-07-22 RX ORDER — ACETAMINOPHEN 325 MG/1
975 TABLET ORAL ONCE
Status: COMPLETED | OUTPATIENT
Start: 2024-07-22 | End: 2024-07-22

## 2024-07-22 RX ADMIN — ACETAMINOPHEN 975 MG: 325 TABLET, FILM COATED ORAL at 09:44

## 2024-07-22 NOTE — ED PROVIDER NOTES
History  Chief Complaint   Patient presents with    Leg Pain     Patient reports she was started on abx and prednisone for ear infection last Wednesday, same day started with cramping b/l calves worsening and now in left thigh.      Patient is a 29-year-old female with a history of GERD who presents with muscle aches.  Patient states that she saw her PCP for an annual visit last Wednesday.  She was having fullness in her left ear, and states that she was diagnosed with a bilateral ear infection.  She was placed on a steroid taper and p.o. Ceftin.  She states that she developed bilateral lower extremity muscle cramps that evening and they have been intermittent since that time.  She has periods where she is asymptomatic and able to swim and hike.  She went on a hike yesterday and states that she drink lots of fluids.  She did not overexert herself but had bilateral lower extremity cramping, left greater than right.  She states that her left thigh today seems to be worse and not improving.  She has taken Tylenol without relief.  She denies recent travel, recent surgery or history of DVT/PE.  She does describe a headache several days ago which has since resolved.  She denies fevers, chills, cough, shortness of breath or other complaints.      History provided by:  Patient  Leg Pain  Associated symptoms: no back pain, no fever and no neck pain        Prior to Admission Medications   Prescriptions Last Dose Informant Patient Reported? Taking?   Loratadine (CLARITIN PO)  Self Yes No   Sig: Take by mouth in the morning   albuterol (Ventolin HFA) 90 mcg/act inhaler  Self No No   Sig: Inhale 2 puffs every 6 (six) hours as needed for wheezing   calcium carbonate (TUMS) 500 mg chewable tablet  Self Yes No   Sig: Chew 1 tablet as needed for indigestion or heartburn    cefuroxime (CEFTIN) 500 mg tablet   No No   Sig: Take 1 tablet (500 mg total) by mouth every 12 (twelve) hours for 10 days   escitalopram (LEXAPRO) 5 mg tablet   Self No No   Sig: take 1 tablet by mouth once daily   famotidine (PEPCID) 20 mg tablet   No No   Si in am 1 in pm   fluticasone (FLONASE) 50 mcg/act nasal spray  Self Yes No   Si sprays into each nostril daily   levonorgestrel (KYLEENA) 19.5 MG intrauterine device  Self Yes No   Si Intra Uterine Device by Intrauterine route once   montelukast (SINGULAIR) 10 mg tablet   No No   Sig: take 1 tablet by mouth once daily   predniSONE 10 mg tablet   No No   Sig: 3 tabs po bid x2 days, then 2 tabs po bid x2 days, then 1 tab bid x2 days, then 1 daily until done.   triamcinolone (KENALOG) 0.5 % cream  Self No No   Sig: Apply topically 3 (three) times a day   Patient taking differently: Apply topically daily as needed (eczema on finger)      Facility-Administered Medications: None       Past Medical History:   Diagnosis Date    Anxiety     GERD (gastroesophageal reflux disease)     Seasonal allergies        Past Surgical History:   Procedure Laterality Date    UPPER GASTROINTESTINAL ENDOSCOPY      WISDOM TOOTH EXTRACTION         Family History   Problem Relation Age of Onset    No Known Problems Mother     No Known Problems Father     No Known Problems Sister     No Known Problems Brother     Lung cancer Maternal Grandmother         70's    Cancer Maternal Grandmother         Lung    No Known Problems Maternal Grandfather     Lung cancer Paternal Grandmother         ?75    Diabetes Paternal Grandmother     Cancer Paternal Grandmother         Lung    Dementia Paternal Grandfather     Breast cancer Paternal Aunt 46    No Known Problems Maternal Aunt     Breast cancer Paternal Aunt         found to be environment related     I have reviewed and agree with the history as documented.    E-Cigarette/Vaping    E-Cigarette Use Never User      E-Cigarette/Vaping Substances    Nicotine No     THC No     CBD No     Flavoring No     Other No     Unknown No      Social History     Tobacco Use    Smoking status: Never      Passive exposure: Never    Smokeless tobacco: Never   Vaping Use    Vaping status: Never Used   Substance Use Topics    Alcohol use: Yes     Alcohol/week: 8.0 standard drinks of alcohol     Types: 8 Standard drinks or equivalent per week    Drug use: Never       Review of Systems   Constitutional:  Negative for chills, diaphoresis and fever.   HENT:  Negative for nosebleeds, sore throat and trouble swallowing.    Eyes:  Negative for photophobia, pain and visual disturbance.   Respiratory:  Negative for cough, chest tightness and shortness of breath.    Cardiovascular:  Negative for chest pain, palpitations and leg swelling.   Gastrointestinal:  Negative for abdominal pain, constipation, diarrhea, nausea and vomiting.   Endocrine: Negative for polydipsia and polyuria.   Genitourinary:  Negative for difficulty urinating, dysuria, hematuria, pelvic pain, vaginal bleeding and vaginal discharge.   Musculoskeletal:  Positive for myalgias. Negative for back pain, neck pain and neck stiffness.   Skin:  Negative for pallor and rash.   Neurological:  Negative for dizziness, seizures, light-headedness and headaches.   All other systems reviewed and are negative.      Physical Exam  Physical Exam  Vitals and nursing note reviewed.   Constitutional:       General: She is not in acute distress.     Appearance: She is well-developed.   HENT:      Head: Normocephalic and atraumatic.      Right Ear: Tympanic membrane normal.      Left Ear: Tympanic membrane normal.      Mouth/Throat:      Mouth: Oropharynx is clear and moist and mucous membranes are normal.   Eyes:      Extraocular Movements: EOM normal.      Pupils: Pupils are equal, round, and reactive to light.   Cardiovascular:      Rate and Rhythm: Normal rate and regular rhythm.      Pulses: Normal pulses.      Heart sounds: Normal heart sounds.   Pulmonary:      Effort: Pulmonary effort is normal. No respiratory distress.      Breath sounds: Normal breath sounds.   Abdominal:       General: There is no distension.      Palpations: Abdomen is soft. Abdomen is not rigid.      Tenderness: There is no abdominal tenderness. There is no guarding or rebound.   Musculoskeletal:         General: Tenderness (left anterior thigh) present. No edema. Normal range of motion.      Cervical back: Normal range of motion and neck supple.      Right lower leg: No edema.      Left lower leg: No edema.   Lymphadenopathy:      Cervical: No cervical adenopathy.   Skin:     General: Skin is warm and dry.      Capillary Refill: Capillary refill takes less than 2 seconds.   Neurological:      Mental Status: She is alert and oriented to person, place, and time.      Cranial Nerves: No cranial nerve deficit.      Sensory: No sensory deficit.   Psychiatric:         Mood and Affect: Mood and affect normal.         Vital Signs  ED Triage Vitals   Temperature Pulse Respirations Blood Pressure SpO2   07/22/24 0821 07/22/24 0821 07/22/24 0821 07/22/24 0821 07/22/24 0821   98.3 °F (36.8 °C) 98 18 138/68 99 %      Temp Source Heart Rate Source Patient Position - Orthostatic VS BP Location FiO2 (%)   07/22/24 0821 07/22/24 0821 07/22/24 0821 07/22/24 0821 --   Oral Monitor Lying Right arm       Pain Score       07/22/24 0944       6           Vitals:    07/22/24 0821 07/22/24 1137   BP: 138/68 115/58   Pulse: 98 71   Patient Position - Orthostatic VS: Lying Lying         Visual Acuity      ED Medications  Medications   acetaminophen (TYLENOL) tablet 975 mg (975 mg Oral Given 7/22/24 0944)       Diagnostic Studies  Results Reviewed       Procedure Component Value Units Date/Time    Comprehensive metabolic panel [[id number]] Collected: 07/22/24 0935    Lab Status: Final result Specimen: Blood from Arm, Right Updated: 07/22/24 1015     Sodium 140 mmol/L      Potassium 3.6 mmol/L      Chloride 103 mmol/L      CO2 30 mmol/L      ANION GAP 7 mmol/L      BUN 8 mg/dL      Creatinine 0.62 mg/dL      Glucose 86 mg/dL      Calcium 10.0  mg/dL      AST 14 U/L      ALT 21 U/L      Alkaline Phosphatase 56 U/L      Total Protein 8.2 g/dL      Albumin 5.0 g/dL      Total Bilirubin 0.68 mg/dL      eGFR 122 ml/min/1.73sq m     Narrative:      National Kid[employer] Foundation guidelines for Chronic Kidney Disease (CKD):     Stage 1 with normal or high GFR (GFR > 90 mL/min/1.73 square meters)    Stage 2 Mild CKD (GFR = 60-89 mL/min/1.73 square meters)    Stage 3A Moderate CKD (GFR = 45-59 mL/min/1.73 square meters)    Stage 3B Moderate CKD (GFR = 30-44 mL/min/1.73 square meters)    Stage 4 Severe CKD (GFR = 15-29 mL/min/1.73 square meters)    Stage 5 End Stage CKD (GFR <15 mL/min/1.73 square meters)  Note: GFR calculation is accurate only with a steady state creatinine    CK [[id number]]  (Normal) Collected: 07/22/24 0935    Lab Status: Final result Specimen: Blood from Arm, Right Updated: 07/22/24 1015     Total CK 42 U/L     Urine Microscopic [[id number]]  (Abnormal) Collected: 07/22/24 0937    Lab Status: Final result Specimen: Urine, Clean Catch Updated: 07/22/24 0953     RBC, UA 30-50 /hpf      WBC, UA 1-2 /hpf      Epithelial Cells Occasional /hpf      Bacteria, UA Occasional /hpf     UA (URINE) with reflex to Scope [[id number]]  (Abnormal) Collected: 07/22/24 0937    Lab Status: Final result Specimen: Urine, Clean Catch Updated: 07/22/24 0951     Color, UA Light Yellow     Clarity, UA Clear     Specific Gravity, UA 1.011     pH, UA 7.5     Leukocytes, UA Negative     Nitrite, UA Negative     Protein, UA Negative mg/dl      Glucose, UA Negative mg/dl      Ketones, UA Negative mg/dl      Urobilinogen, UA <2.0 mg/dl      Bilirubin, UA Negative     Occult Blood, UA Moderate    CBC and differential [[id number]] Collected: 07/22/24 0935    Lab Status: Final result Specimen: Blood from Arm, Right Updated: 07/22/24 0951     WBC 8.24 Thousand/uL      RBC 4.65 Million/uL      Hemoglobin 15.4 g/dL      Hematocrit 44.0 %      MCV 95 fL      MCH 33.1 pg      MCHC  35.0 g/dL      RDW 12.0 %      MPV 10.0 fL      Platelets 288 Thousands/uL      nRBC 0 /100 WBCs      Segmented % 64 %      Immature Grans % 0 %      Lymphocytes % 27 %      Monocytes % 8 %      Eosinophils Relative 1 %      Basophils Relative 0 %      Absolute Neutrophils 5.25 Thousands/µL      Absolute Immature Grans 0.03 Thousand/uL      Absolute Lymphocytes 2.24 Thousands/µL      Absolute Monocytes 0.65 Thousand/µL      Eosinophils Absolute 0.04 Thousand/µL      Basophils Absolute 0.03 Thousands/µL     POCT pregnancy, urine [[id number]]  (Normal) Resulted: 07/22/24 0937    Lab Status: Final result Updated: 07/22/24 0937     EXT Preg Test, Ur Negative     Control Valid                    VAS VENOUS DUPLEX - LOWER LIMB BILATERAL   Final Result by Denise Fried DO (07/22 8969)                 Procedures  Procedures         ED Course  ED Course as of 07/25/24 1710   Mon Jul 22, 2024   1016 Spoke with patient and she is comfortable with ultrasounds of her lower extremities.  If negative, she will follow-up as outpatient.   1116 Patient resting comfortably.  I let her know that she is scheduled for duplex but there must be other patients in front of her.  She does not need anything at this time and appreciated the update.  Tylenol has helped significantly.   1217 Duplex negative for DVT.   1217 Spoke with patient who is comfortable with discharge and outpatient follow-up.  She agrees to return to ED if symptoms worsen.                                               Medical Decision Making  Patient presents with cramping in bilateral lower extremities and pain in her left thigh.  Duplex negative for DVT.  There is no evidence of acute infectious process, including cellulitis,  necrotizing soft tissue infection, osteomyelitis.  Labs do not indicate myositis or rhabdomyolysis.  Possible muscle strain.  Patient advised to continue Tylenol as needed and follow-up with PCP.  On her exam today, I do not feel that she  "requires antibiotics or prednisone for acute otitis media.  However her rest other she wanted to continue and complete the she agrees follow-up with PCP and return to ED if symptoms worsen or she develops fevers, rash or other concerns.    Portions of the above record have been created with voice recognition software.  Occasional wrong word or \"sound alike\" substitutions may have occurred due to the inherent limitations of voice recognition software.  Read the chart carefully and recognize, using context, where substitutions may have occurred.      Problems Addressed:  Bilateral leg pain:     Details: No evidence of VTE, infection, myositis, rhabdomyolysis or other significant allergy.  Continue to treat with supportive care and return to ED if symptoms worsen.  History of ear infection:     Details: No evidence of acute otitis media at this time.  Patient to follow-up with PCP    Amount and/or Complexity of Data Reviewed  External Data Reviewed: notes.  Labs: ordered.    Risk  OTC drugs.                 Disposition  Final diagnoses:   Bilateral leg pain   History of ear infection     Time reflects when diagnosis was documented in both MDM as applicable and the Disposition within this note       Time User Action Codes Description Comment    7/22/2024 12:14 PM Carlos Eduardo Amos Add [M79.605] Left leg pain     7/22/2024 12:15 PM Carlos Eduardo Amos Remove [M79.605] Left leg pain     7/22/2024 12:15 PM Carlos Eduardo Amos L Add [M79.604,  M79.605] Bilateral leg pain     7/22/2024 12:15 PM Carlos Eduardo Amos Add [Z86.69] History of ear infection           ED Disposition       ED Disposition   Discharge    Condition   Stable    Date/Time   Mon Jul 22, 2024 12:14 PM    Comment   Danielle Andrews discharge to home/self care.                   Follow-up Information       Follow up With Specialties Details Why Contact Info    Emily Echols, DO Family Medicine Schedule an appointment as soon as possible for a visit   " [address]  [address]  [place] [address]  [phone]              Discharge Medication List as of 7/22/2024 12:17 PM        CONTINUE these medications which have NOT CHANGED    Details   albuterol (Ventolin HFA) 90 mcg/act inhaler Inhale 2 puffs every 6 (six) hours as needed for wheezing, Starting Tue 8/10/2021, Normal      calcium carbonate (TUMS) 500 mg chewable tablet Chew 1 tablet as needed for indigestion or heartburn , Historical Med      cefuroxime (CEFTIN) 500 mg tablet Take 1 tablet (500 mg total) by mouth every 12 (twelve) hours for 10 days, Starting Wed 7/17/2024, Until Sat 7/27/2024, Normal      escitalopram (LEXAPRO) 5 mg tablet take 1 tablet by mouth once daily, Starting Fri 3/1/2024, Normal      famotidine (PEPCID) 20 mg tablet 2 in am 1 in pm, Normal      fluticasone (FLONASE) 50 mcg/act nasal spray 2 sprays into each nostril daily, Historical Med      levonorgestrel (KYLEENA) 19.5 MG intrauterine device 1 Intra Uterine Device by Intrauterine route once, Historical Med      Loratadine (CLARITIN PO) Take by mouth in the morning, Historical Med      montelukast (SINGULAIR) 10 mg tablet take 1 tablet by mouth once daily, Starting Tue 7/9/2024, Normal      predniSONE 10 mg tablet 3 tabs po bid x2 days, then 2 tabs po bid x2 days, then 1 tab bid x2 days, then 1 daily until done., Normal      triamcinolone (KENALOG) 0.5 % cream Apply topically 3 (three) times a day, Starting Tue 3/22/2022, Normal             No discharge procedures on file.    PDMP Review         Value Time User    PDMP Reviewed  Yes 5/20/2020  2:04 PM Emily Echols DO            ED Provider  Electronically Signed by             Carlos Eduardo Amos DO  07/25/24 3017

## 2024-08-06 ENCOUNTER — OFFICE VISIT (OUTPATIENT)
Dept: FAMILY MEDICINE CLINIC | Facility: CLINIC | Age: 29
End: 2024-08-06
Payer: COMMERCIAL

## 2024-08-06 VITALS
DIASTOLIC BLOOD PRESSURE: 70 MMHG | HEART RATE: 68 BPM | TEMPERATURE: 97.5 F | BODY MASS INDEX: 27.99 KG/M2 | HEIGHT: 65 IN | OXYGEN SATURATION: 96 % | WEIGHT: 168 LBS | SYSTOLIC BLOOD PRESSURE: 118 MMHG

## 2024-08-06 DIAGNOSIS — H69.92 EUSTACHIAN TUBE DYSFUNCTION, LEFT: Primary | ICD-10-CM

## 2024-08-06 DIAGNOSIS — R21 RASH: ICD-10-CM

## 2024-08-06 PROCEDURE — 99214 OFFICE O/P EST MOD 30 MIN: CPT | Performed by: NURSE PRACTITIONER

## 2024-08-06 RX ORDER — KETOCONAZOLE 20 MG/G
CREAM TOPICAL DAILY
Qty: 15 G | Refills: 0 | Status: SHIPPED | OUTPATIENT
Start: 2024-08-06

## 2024-08-06 RX ORDER — CIPROFLOXACIN AND DEXAMETHASONE 3; 1 MG/ML; MG/ML
4 SUSPENSION/ DROPS AURICULAR (OTIC) 2 TIMES DAILY
Qty: 7.5 ML | Refills: 0 | Status: SHIPPED | OUTPATIENT
Start: 2024-08-06 | End: 2024-08-11

## 2024-08-06 NOTE — PROGRESS NOTES
Ambulatory Visit  Name: Danielle Andrews      : 1995      MRN: [id number]  Encounter Provider: APPLE Wagner  Encounter Date: 2024   Encounter department: [facility]    Assessment & Plan   1. Eustachian tube dysfunction, left  -     ciprofloxacin-dexamethasone (CIPRODEX) otic suspension; Administer 4 drops into the left ear 2 (two) times a day for 5 days  2. Rash  -     ketoconazole (NIZORAL) 2 % cream; Apply topically daily    #1  Eustachian tube dysfunction, left  Discussed with patient plan to treat with ciprofloxacin-dexamethasone otic suspension for 5 days  #2 Rash  Discussed with patient plan to treat with ketoconazole cream daily until rash improves  Patient instructed to call if no improvement in 72 hours or symptoms worsen      Depression Screening and Follow-up Plan: Patient was screened for depression during today's encounter. They screened negative with a PHQ-2 score of 0.        History of Present Illness     29 y.o.female presenting with left ear pain and development of rash on left hand after use of steroids. Patient reports that she had her annual physical on 2024 and was found to have bilateral eustachian tube dysfunction. She was treated with a 10 day course of cefuroxime and a tapering course of prednisone. She went to an urgent care on 2024 after developing bilateral cramping of calves with the left worst. She had a venous duplex with no findings of DVT. She reports that the cramping did improve but her left ear is hurting and she developed a rash on her left hand after everything.       Review of Systems   Constitutional:  Negative for chills, fatigue and fever.   HENT:  Positive for ear pain. Negative for congestion, postnasal drip, rhinorrhea, sinus pressure, sinus pain, sneezing and sore throat.    Respiratory: Negative.     Cardiovascular: Negative.    Gastrointestinal: Negative.    Musculoskeletal: Negative.    Skin:  Positive for  rash.   Neurological: Negative.    Psychiatric/Behavioral: Negative.       Past Medical History:   Diagnosis Date   • Anxiety    • GERD (gastroesophageal reflux disease)    • Seasonal allergies      Past Surgical History:   Procedure Laterality Date   • UPPER GASTROINTESTINAL ENDOSCOPY     • WISDOM TOOTH EXTRACTION       Family History   Problem Relation Age of Onset   • No Known Problems Mother    • No Known Problems Father    • No Known Problems Sister    • No Known Problems Brother    • Lung cancer Maternal Grandmother         70's   • Cancer Maternal Grandmother         Lung   • No Known Problems Maternal Grandfather    • Lung cancer Paternal Grandmother         ?75   • Diabetes Paternal Grandmother    • Cancer Paternal Grandmother         Lung   • Dementia Paternal Grandfather    • Breast cancer Paternal Aunt 46   • No Known Problems Maternal Aunt    • Breast cancer Paternal Aunt         found to be environment related     Social History     Tobacco Use   • Smoking status: Never     Passive exposure: Never   • Smokeless tobacco: Never   Vaping Use   • Vaping status: Never Used   Substance and Sexual Activity   • Alcohol use: Yes     Alcohol/week: 8.0 standard drinks of alcohol     Types: 8 Standard drinks or equivalent per week   • Drug use: Never   • Sexual activity: Yes     Partners: Male     Birth control/protection: I.U.D.     Current Outpatient Medications on File Prior to Visit   Medication Sig   • albuterol (Ventolin HFA) 90 mcg/act inhaler Inhale 2 puffs every 6 (six) hours as needed for wheezing   • calcium carbonate (TUMS) 500 mg chewable tablet Chew 1 tablet as needed for indigestion or heartburn    • escitalopram (LEXAPRO) 5 mg tablet take 1 tablet by mouth once daily   • famotidine (PEPCID) 20 mg tablet 2 in am 1 in pm   • fluticasone (FLONASE) 50 mcg/act nasal spray 2 sprays into each nostril daily   • levonorgestrel (KYLEENA) 19.5 MG intrauterine device 1 Intra Uterine Device by Intrauterine  "route once   • Loratadine (CLARITIN PO) Take by mouth in the morning   • montelukast (SINGULAIR) 10 mg tablet take 1 tablet by mouth once daily   • triamcinolone (KENALOG) 0.5 % cream Apply topically 3 (three) times a day (Patient taking differently: Apply topically daily as needed (eczema on finger))   • predniSONE 10 mg tablet 3 tabs po bid x2 days, then 2 tabs po bid x2 days, then 1 tab bid x2 days, then 1 daily until done. (Patient not taking: Reported on 8/6/2024)     No Known Allergies  Immunization History   Administered Date(s) Administered   • COVID-19 PFIZER VACCINE 0.3 ML IM 04/20/2021, 05/12/2021   • DTaP 5 1995, 1995, 1995, 12/16/1996, 05/26/2000   • Hep B, adult 1995, 1995, 1995   • Hib (PRP-OMP) 1995, 1995, 1995, 07/17/1996   • INFLUENZA 12/04/2022   • IPV 1995, 1995, 1995, 05/26/2000   • Influenza Injectable, MDCK, Preservative Free, Quadrivalent, 0.5 mL 12/14/2019   • Influenza LAIV (Nasal) 11/08/2006, 10/13/2008, 06/18/2013, 08/12/2014   • Influenza, seasonal, injectable 12/04/2017   • MMR 06/18/1996, 05/26/2000   • Meningococcal, Unknown Serogroups 01/15/2010   • Td (adult), adsorbed 08/23/2006   • Tdap 04/02/2022   • Varicella 09/17/1996, 04/01/2011     Objective     /70 (BP Location: Right arm, Patient Position: Sitting, Cuff Size: Large)   Pulse 68   Temp 97.5 °F (36.4 °C)   Ht 5' 5\" (1.651 m)   Wt 76.2 kg (168 lb)   LMP 07/15/2024 (Exact Date)   SpO2 96%   BMI 27.96 kg/m² (Reviewed)    Physical Exam  Vitals reviewed.   Constitutional:       General: She is not in acute distress.     Appearance: She is not ill-appearing.   HENT:      Head: Normocephalic and atraumatic.      Right Ear: Ear canal and external ear normal. No tenderness. No middle ear effusion. Tympanic membrane is not bulging.      Left Ear: Ear canal and external ear normal. No tenderness.  No middle ear effusion. Tympanic membrane is bulging.    "   Nose: Nose normal.      Mouth/Throat:      Mouth: Mucous membranes are moist.      Pharynx: Oropharynx is clear.   Eyes:      Extraocular Movements: Extraocular movements intact.      Conjunctiva/sclera: Conjunctivae normal.      Pupils: Pupils are equal, round, and reactive to light.   Cardiovascular:      Rate and Rhythm: Normal rate and regular rhythm.      Heart sounds: Normal heart sounds.   Pulmonary:      Effort: Pulmonary effort is normal.      Breath sounds: Normal breath sounds.   Skin:     General: Skin is warm and dry.      Capillary Refill: Capillary refill takes less than 2 seconds.      Findings: Rash (see image) present.   Neurological:      Mental Status: She is alert and oriented to person, place, and time.   Psychiatric:         Mood and Affect: Mood normal.         Behavior: Behavior normal.

## 2024-08-12 DIAGNOSIS — R21 RASH: Primary | ICD-10-CM

## 2024-09-10 DIAGNOSIS — F41.9 ANXIETY: ICD-10-CM

## 2024-09-10 RX ORDER — ESCITALOPRAM OXALATE 5 MG/1
5 TABLET ORAL DAILY
Qty: 30 TABLET | Refills: 5 | Status: SHIPPED | OUTPATIENT
Start: 2024-09-10

## 2025-01-14 DIAGNOSIS — K21.9 GASTROESOPHAGEAL REFLUX DISEASE WITHOUT ESOPHAGITIS: ICD-10-CM

## 2025-01-14 DIAGNOSIS — J30.9 ALLERGIC RHINITIS, UNSPECIFIED SEASONALITY, UNSPECIFIED TRIGGER: ICD-10-CM

## 2025-01-14 RX ORDER — MONTELUKAST SODIUM 10 MG/1
10 TABLET ORAL DAILY
Qty: 90 TABLET | Refills: 1 | Status: SHIPPED | OUTPATIENT
Start: 2025-01-14

## 2025-01-15 RX ORDER — FAMOTIDINE 20 MG/1
TABLET, FILM COATED ORAL
Qty: 90 TABLET | Refills: 5 | Status: SHIPPED | OUTPATIENT
Start: 2025-01-15

## 2025-02-07 ENCOUNTER — NEW PATIENT COMPREHENSIVE (OUTPATIENT)
Dept: URBAN - METROPOLITAN AREA CLINIC 6 | Facility: CLINIC | Age: 30
End: 2025-02-07

## 2025-02-07 DIAGNOSIS — H52.13: ICD-10-CM

## 2025-02-07 DIAGNOSIS — D31.31: ICD-10-CM

## 2025-02-07 PROCEDURE — 92310 CONTACT LENS FITTING OU: CPT

## 2025-02-07 PROCEDURE — MISCOPTOS MISCELLANEOUS, OPTOS

## 2025-02-07 PROCEDURE — 92004 COMPRE OPH EXAM NEW PT 1/>: CPT

## 2025-02-07 PROCEDURE — 92015 DETERMINE REFRACTIVE STATE: CPT

## 2025-02-07 ASSESSMENT — VISUAL ACUITY
OD_CC: 20/20
OS_CC: 20/20

## 2025-02-07 ASSESSMENT — TONOMETRY
OS_IOP_MMHG: 21
OD_IOP_MMHG: 22

## 2025-03-10 ENCOUNTER — OFFICE VISIT (OUTPATIENT)
Dept: FAMILY MEDICINE CLINIC | Facility: CLINIC | Age: 30
End: 2025-03-10
Payer: COMMERCIAL

## 2025-03-10 VITALS
SYSTOLIC BLOOD PRESSURE: 122 MMHG | HEART RATE: 77 BPM | BODY MASS INDEX: 27.82 KG/M2 | OXYGEN SATURATION: 97 % | HEIGHT: 65 IN | TEMPERATURE: 98.1 F | WEIGHT: 167 LBS | DIASTOLIC BLOOD PRESSURE: 68 MMHG

## 2025-03-10 DIAGNOSIS — J01.00 ACUTE NON-RECURRENT MAXILLARY SINUSITIS: Primary | ICD-10-CM

## 2025-03-10 PROCEDURE — 99213 OFFICE O/P EST LOW 20 MIN: CPT | Performed by: NURSE PRACTITIONER

## 2025-03-10 RX ORDER — AZITHROMYCIN 250 MG/1
TABLET, FILM COATED ORAL
Qty: 6 TABLET | Refills: 0 | Status: SHIPPED | OUTPATIENT
Start: 2025-03-10 | End: 2025-03-14

## 2025-03-10 NOTE — PROGRESS NOTES
"Name: Danielle Andrews      : 1995      MRN: [id number]  Encounter Provider: APPLE Wagner  Encounter Date: 3/10/2025   Encounter department: [facility]  :  Assessment & Plan  Acute non-recurrent maxillary sinusitis    Orders:  •  azithromycin (ZITHROMAX) 250 mg tablet; Take 2 tabs on Day 1 than 1 tab Days 2-5        BMI Counseling: Body mass index is 27.79 kg/m². The BMI is above normal. Exercise recommendations include exercising 3-5 times per week and strength training exercises.       History of Present Illness   29 y.o.female presenting with lingering facial pressure, left sided neck pain and a metallic taste in mouth for the past couple of days. She reports having a viral upper respiratory infection approximately a month ago but it resolved for the most part.           Review of Systems   Constitutional:  Negative for chills, fatigue and fever.   HENT:  Positive for ear pain and sinus pressure. Negative for congestion.    Respiratory: Negative.     Cardiovascular: Negative.    Gastrointestinal: Negative.    Musculoskeletal: Negative.    Neurological: Negative.    Psychiatric/Behavioral: Negative.         Objective   /68 (BP Location: Right arm, Patient Position: Sitting, Cuff Size: Large)   Pulse 77   Temp 98.1 °F (36.7 °C) (Temporal)   Ht 5' 5\" (1.651 m)   Wt 75.8 kg (167 lb)   SpO2 97%   BMI 27.79 kg/m² (Reviewed)     Physical Exam  Vitals reviewed.   Constitutional:       General: She is not in acute distress.     Appearance: She is not ill-appearing.   HENT:      Head: Normocephalic and atraumatic.      Right Ear: Tympanic membrane, ear canal and external ear normal.      Left Ear: Ear canal and external ear normal. Tympanic membrane is retracted.      Nose: Nasal tenderness, mucosal edema and congestion present.      Right Sinus: Maxillary sinus tenderness present.      Left Sinus: Maxillary sinus tenderness present.      Mouth/Throat:      Lips: " Pink.      Mouth: Mucous membranes are moist.   Eyes:      Extraocular Movements: Extraocular movements intact.      Conjunctiva/sclera: Conjunctivae normal.      Pupils: Pupils are equal, round, and reactive to light.   Cardiovascular:      Rate and Rhythm: Normal rate and regular rhythm.      Heart sounds: Normal heart sounds.   Pulmonary:      Effort: Pulmonary effort is normal.      Breath sounds: Normal breath sounds.   Musculoskeletal:      Cervical back: Neck supple.   Lymphadenopathy:      Cervical: Cervical adenopathy present.   Skin:     General: Skin is warm and dry.      Capillary Refill: Capillary refill takes less than 2 seconds.   Neurological:      Mental Status: She is alert and oriented to person, place, and time.   Psychiatric:         Mood and Affect: Mood normal.         Behavior: Behavior normal.         BMI Counseling: Body mass index is 27.79 kg/m². The BMI is above normal. Nutrition recommendations include reducing portion sizes, decreasing overall calorie intake, 3-5 servings of fruits/vegetables daily, reducing fast food intake, consuming healthier snacks, decreasing soda and/or juice intake, moderation in carbohydrate intake, and increasing intake of lean protein. Exercise recommendations include moderate aerobic physical activity for 150 minutes/week and strength training exercises.

## 2025-03-25 DIAGNOSIS — F41.9 ANXIETY: ICD-10-CM

## 2025-03-26 RX ORDER — ESCITALOPRAM OXALATE 5 MG/1
5 TABLET ORAL DAILY
Qty: 30 TABLET | Refills: 5 | Status: SHIPPED | OUTPATIENT
Start: 2025-03-26

## 2025-05-15 ENCOUNTER — ANNUAL EXAM (OUTPATIENT)
Dept: OBGYN CLINIC | Facility: CLINIC | Age: 30
End: 2025-05-15
Payer: COMMERCIAL

## 2025-05-15 VITALS
SYSTOLIC BLOOD PRESSURE: 124 MMHG | WEIGHT: 168.6 LBS | HEIGHT: 65 IN | DIASTOLIC BLOOD PRESSURE: 72 MMHG | BODY MASS INDEX: 28.09 KG/M2

## 2025-05-15 DIAGNOSIS — Z11.51 SCREENING FOR HPV (HUMAN PAPILLOMAVIRUS): ICD-10-CM

## 2025-05-15 DIAGNOSIS — Z01.419 WELL WOMAN EXAM WITH ROUTINE GYNECOLOGICAL EXAM: Primary | ICD-10-CM

## 2025-05-15 DIAGNOSIS — Z12.4 ENCOUNTER FOR SCREENING FOR MALIGNANT NEOPLASM OF CERVIX: ICD-10-CM

## 2025-05-15 PROCEDURE — G0143 SCR C/V CYTO,THINLAYER,RESCR: HCPCS | Performed by: OBSTETRICS & GYNECOLOGY

## 2025-05-15 PROCEDURE — 99395 PREV VISIT EST AGE 18-39: CPT | Performed by: OBSTETRICS & GYNECOLOGY

## 2025-05-15 PROCEDURE — G0476 HPV COMBO ASSAY CA SCREEN: HCPCS | Performed by: OBSTETRICS & GYNECOLOGY

## 2025-05-15 NOTE — PROGRESS NOTES
ASSESSMENT & PLAN:   Diagnoses and all orders for this visit:    Well woman exam with routine gynecological exam  -     Liquid-based pap, screening    Encounter for screening for malignant neoplasm of cervix  -     Liquid-based pap, screening    Screening for HPV (human papillomavirus)  -     Liquid-based pap, screening          The following were reviewed in today's visit: ASCCP guidelines, Gardasil vaccination, STD testing breast self exam, family planning choices, exercise, and healthy diet.    Patient to return to office in yearly for annual exam.     All questions have been answered to her satisfaction.        CC:  Annual Gynecologic Examination  Chief Complaint   Patient presents with    Gynecologic Exam     Patient presents for her yearly exam.  Neg pap 22  Kyleena insertion 20         HPI: Danielle Andrews is a 30 y.o.  who presents for annual gynecologic examination.  She has the following concerns:  kyleena expires in Sept - likely will do exchange      Health Maintenance:    Exercise: intermittently  Breast exams/breast awareness: yes    Past Medical History:   Diagnosis Date    Anxiety     GERD (gastroesophageal reflux disease)     Seasonal allergies        Past Surgical History:   Procedure Laterality Date    UPPER GASTROINTESTINAL ENDOSCOPY      WISDOM TOOTH EXTRACTION         Past OB/Gyn History:   No LMP recorded (lmp unknown). Patient has had an implant.    Last Pap:  : no abnormalities  History of abnormal Pap smear: no  HPV vaccine completed: unknown    Patient is currently sexually active.   STD testing: no  Current contraception:IUD - Kyleena      Family History  Family History   Problem Relation Age of Onset    No Known Problems Mother     No Known Problems Father     No Known Problems Sister     No Known Problems Brother     Lung cancer Maternal Grandmother         70's    Cancer Maternal Grandmother         Lung    No Known Problems Maternal Grandfather     Lung cancer  "Paternal Grandmother         ?75    Diabetes Paternal Grandmother     Cancer Paternal Grandmother         Lung    Dementia Paternal Grandfather     Breast cancer Paternal Aunt 46    No Known Problems Maternal Aunt     Breast cancer Paternal Aunt         found to be environment related       Family history of uterine or ovarian cancer: no  Family history of breast cancer: yes  Family history of colon cancer: no    Social History:  Social History     Socioeconomic History    Marital status: /Civil Union     Spouse name: Not on file    Number of children: Not on file    Years of education: Not on file    Highest education level: Not on file   Occupational History    Not on file   Tobacco Use    Smoking status: Never     Passive exposure: Never    Smokeless tobacco: Never   Vaping Use    Vaping status: Never Used   Substance and Sexual Activity    Alcohol use: Yes     Alcohol/week: 8.0 standard drinks of alcohol     Types: 8 Standard drinks or equivalent per week    Drug use: Never    Sexual activity: Yes     Partners: Male     Birth control/protection: I.U.D.   Other Topics Concern    Not on file   Social History Narrative    Not on file     Social Drivers of Health     Financial Resource Strain: Not on file   Food Insecurity: Not on file   Transportation Needs: Not on file   Physical Activity: Not on file   Stress: Not on file   Social Connections: Not on file   Intimate Partner Violence: Not on file   Housing Stability: Not on file     Domestic violence screen: negative    Allergies:  Allergies[1]    Medications:  Current Medications[2]    Review of Systems:  Review of Systems   Constitutional: Negative.    HENT: Negative.     Respiratory: Negative.     Cardiovascular: Negative.    Gastrointestinal: Negative.    Genitourinary: Negative.    Neurological: Negative.          Physical Exam:  /72 (BP Location: Left arm, Patient Position: Sitting, Cuff Size: Standard)   Ht 5' 5\" (1.651 m)   Wt 76.5 kg (168 " lb 9.6 oz)   LMP  (LMP Unknown)   Breastfeeding No   BMI 28.06 kg/m²    Physical Exam  Constitutional:       Appearance: Normal appearance.   Genitourinary:      Bladder and urethral meatus normal.      No lesions in the vagina.      Right Labia: No rash, tenderness, lesions, skin changes or Bartholin's cyst.     Left Labia: No tenderness, lesions, skin changes, Bartholin's cyst or rash.     No vaginal erythema, tenderness or bleeding.        Right Adnexa: not tender, not full and no mass present.     Left Adnexa: not tender, not full and no mass present.     Cervix is nulliparous.      No cervical motion tenderness, discharge or polyp.      IUD strings visualized.      Uterus is not enlarged, fixed or tender.      No uterine mass detected.     Urethral meatus caruncle not present.     No urethral tenderness or mass present.   Breasts:     Right: No swelling, bleeding, inverted nipple, mass, nipple discharge, skin change or tenderness.      Left: No swelling, bleeding, inverted nipple, mass, nipple discharge, skin change or tenderness.   HENT:      Head: Normocephalic and atraumatic.     Eyes:      Extraocular Movements: Extraocular movements intact.      Conjunctiva/sclera: Conjunctivae normal.      Pupils: Pupils are equal, round, and reactive to light.       Cardiovascular:      Rate and Rhythm: Normal rate and regular rhythm.      Heart sounds: Normal heart sounds. No murmur heard.  Pulmonary:      Effort: Pulmonary effort is normal. No respiratory distress.      Breath sounds: Normal breath sounds. No wheezing or rales.   Abdominal:      General: There is no distension.      Palpations: Abdomen is soft.      Tenderness: There is no abdominal tenderness. There is no guarding.     Neurological:      General: No focal deficit present.      Mental Status: She is alert and oriented to person, place, and time.     Skin:     General: Skin is warm and dry.     Psychiatric:         Mood and Affect: Mood normal.          Behavior: Behavior normal.   Vitals and nursing note reviewed.                                    [1] No Known Allergies  [2]   Current Outpatient Medications:     albuterol (Ventolin HFA) 90 mcg/act inhaler, Inhale 2 puffs every 6 (six) hours as needed for wheezing, Disp: 18 g, Rfl: 5    ammonium lactate (LAC-HYDRIN) 5 % lotion, Apply topically 2 (two) times a day as needed (rash), Disp: 222 mL, Rfl: 0    calcium carbonate (TUMS) 500 mg chewable tablet, Chew 1 tablet as needed for indigestion or heartburn, Disp: , Rfl:     escitalopram (LEXAPRO) 5 mg tablet, take 1 tablet by mouth once daily, Disp: 30 tablet, Rfl: 5    famotidine (PEPCID) 20 mg tablet, TAKE 2 TABLETS BY MOUTH IN THE MORNING AND 1 TABLET IN THE EVENING, Disp: 90 tablet, Rfl: 5    fluticasone (FLONASE) 50 mcg/act nasal spray, 2 sprays into each nostril in the morning., Disp: , Rfl:     ketoconazole (NIZORAL) 2 % cream, Apply topically daily, Disp: 15 g, Rfl: 0    levonorgestrel (KYLEENA) 19.5 MG intrauterine device, 1 Intra Uterine Device by Intrauterine route once, Disp: , Rfl:     Loratadine (CLARITIN PO), Take by mouth in the morning, Disp: , Rfl:     montelukast (SINGULAIR) 10 mg tablet, take 1 tablet by mouth once daily, Disp: 90 tablet, Rfl: 1    triamcinolone (KENALOG) 0.5 % cream, Apply topically 3 (three) times a day, Disp: 30 g, Rfl: 0

## 2025-05-16 LAB
HPV HR 12 DNA CVX QL NAA+PROBE: NEGATIVE
HPV16 DNA CVX QL NAA+PROBE: NEGATIVE
HPV18 DNA CVX QL NAA+PROBE: NEGATIVE

## 2025-05-20 ENCOUNTER — RESULTS FOLLOW-UP (OUTPATIENT)
Dept: OBGYN CLINIC | Facility: CLINIC | Age: 30
End: 2025-05-20

## 2025-05-20 LAB
LAB AP GYN PRIMARY INTERPRETATION: NORMAL
Lab: NORMAL

## 2025-07-15 DIAGNOSIS — F41.9 ANXIETY: ICD-10-CM

## 2025-07-15 DIAGNOSIS — K21.9 GASTROESOPHAGEAL REFLUX DISEASE WITHOUT ESOPHAGITIS: ICD-10-CM

## 2025-07-15 DIAGNOSIS — J30.9 ALLERGIC RHINITIS, UNSPECIFIED SEASONALITY, UNSPECIFIED TRIGGER: ICD-10-CM

## 2025-07-16 RX ORDER — ESCITALOPRAM OXALATE 5 MG/1
5 TABLET ORAL DAILY
Qty: 30 TABLET | Refills: 5 | Status: SHIPPED | OUTPATIENT
Start: 2025-07-16

## 2025-07-16 RX ORDER — MONTELUKAST SODIUM 10 MG/1
10 TABLET ORAL DAILY
Qty: 90 TABLET | Refills: 1 | Status: SHIPPED | OUTPATIENT
Start: 2025-07-16

## 2025-07-16 RX ORDER — FAMOTIDINE 20 MG/1
TABLET, FILM COATED ORAL
Qty: 90 TABLET | Refills: 1 | Status: SHIPPED | OUTPATIENT
Start: 2025-07-16